# Patient Record
Sex: MALE | Race: WHITE | NOT HISPANIC OR LATINO | Employment: OTHER | URBAN - METROPOLITAN AREA
[De-identification: names, ages, dates, MRNs, and addresses within clinical notes are randomized per-mention and may not be internally consistent; named-entity substitution may affect disease eponyms.]

---

## 2018-08-21 RX ORDER — PROMETHAZINE HYDROCHLORIDE 25 MG/1
25 TABLET ORAL EVERY 6 HOURS PRN
COMMUNITY
End: 2020-02-24

## 2018-08-21 RX ORDER — LOSARTAN POTASSIUM AND HYDROCHLOROTHIAZIDE 25; 100 MG/1; MG/1
1 TABLET ORAL DAILY
COMMUNITY
End: 2022-08-09

## 2018-08-21 RX ORDER — GLIMEPIRIDE 2 MG/1
2 TABLET ORAL
COMMUNITY
End: 2022-08-09

## 2018-08-21 RX ORDER — TRIAMCINOLONE ACETONIDE 1 MG/G
CREAM TOPICAL 2 TIMES DAILY
COMMUNITY
End: 2022-08-09

## 2018-08-21 RX ORDER — TRAZODONE HYDROCHLORIDE 50 MG/1
50 TABLET ORAL NIGHTLY
COMMUNITY
End: 2023-04-04

## 2018-08-21 RX ORDER — FLUCONAZOLE 200 MG/1
200 TABLET ORAL WEEKLY
COMMUNITY
End: 2020-02-24

## 2018-08-21 RX ORDER — MELOXICAM 15 MG/1
15 TABLET ORAL DAILY
COMMUNITY
End: 2020-02-24

## 2018-08-21 RX ORDER — CLOBETASOL PROPIONATE 0.5 MG/G
CREAM TOPICAL 2 TIMES DAILY
COMMUNITY
End: 2022-08-09

## 2018-08-21 RX ORDER — VALSARTAN 320 MG/1
320 TABLET ORAL DAILY
COMMUNITY
End: 2020-02-24 | Stop reason: SDUPTHER

## 2018-08-21 RX ORDER — CYCLOBENZAPRINE HCL 10 MG
10 TABLET ORAL 3 TIMES DAILY PRN
COMMUNITY
End: 2020-02-24

## 2018-08-21 RX ORDER — RANITIDINE 300 MG/1
300 TABLET ORAL NIGHTLY
COMMUNITY
End: 2020-02-24

## 2018-08-21 RX ORDER — DICLOFENAC SODIUM 75 MG/1
75 TABLET, DELAYED RELEASE ORAL 2 TIMES DAILY
COMMUNITY
End: 2020-02-24

## 2018-08-21 RX ORDER — HYDROXYZINE HYDROCHLORIDE 25 MG/1
25 TABLET, FILM COATED ORAL EVERY 6 HOURS PRN
COMMUNITY
End: 2022-08-09

## 2018-08-22 ENCOUNTER — OFFICE VISIT (OUTPATIENT)
Dept: NEUROSURGERY | Facility: CLINIC | Age: 48
End: 2018-08-22

## 2018-08-22 VITALS — RESPIRATION RATE: 19 BRPM | WEIGHT: 315 LBS | HEIGHT: 73 IN | BODY MASS INDEX: 41.75 KG/M2 | TEMPERATURE: 97.8 F

## 2018-08-22 DIAGNOSIS — M47.816 FACET ARTHRITIS OF LUMBAR REGION: ICD-10-CM

## 2018-08-22 DIAGNOSIS — M51.36 BULGING LUMBAR DISC: ICD-10-CM

## 2018-08-22 DIAGNOSIS — M54.9 MECHANICAL BACK PAIN: Primary | ICD-10-CM

## 2018-08-22 DIAGNOSIS — M48.061 SPINAL STENOSIS OF LUMBAR REGION WITHOUT NEUROGENIC CLAUDICATION: ICD-10-CM

## 2018-08-22 DIAGNOSIS — M51.36 DEGENERATIVE DISC DISEASE, LUMBAR: ICD-10-CM

## 2018-08-22 PROCEDURE — 99243 OFF/OP CNSLTJ NEW/EST LOW 30: CPT | Performed by: NEUROLOGICAL SURGERY

## 2018-08-22 NOTE — PROGRESS NOTES
Patient: Booker De Oliveira  : 1970    Primary Care Provider: Juan Miguel Dominguez MD    Requesting Provider: As above        History    Chief Complaint: Low back pain.    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  Recently the pain increased.  At times it was extending into the left thigh to the knee.  He's been treated with muscle relaxants and ibuprofen.  His pain is largely improved at this point.  He denies any bowel or bladder dysfunction.  When present his symptoms were worsened by movement.  In addition to medicines ice was helpful.    Review of Systems   Constitutional: Negative for activity change, appetite change, chills, diaphoresis, fatigue, fever and unexpected weight change.   HENT: Negative for congestion, dental problem, drooling, ear discharge, ear pain, facial swelling, hearing loss, mouth sores, nosebleeds, postnasal drip, rhinorrhea, sinus pressure, sneezing, sore throat, tinnitus, trouble swallowing and voice change.    Eyes: Negative for photophobia, pain, discharge, redness, itching and visual disturbance.   Respiratory: Negative for apnea, cough, choking, chest tightness, shortness of breath, wheezing and stridor.    Cardiovascular: Negative for chest pain, palpitations and leg swelling.   Gastrointestinal: Negative for abdominal distention, abdominal pain, anal bleeding, blood in stool, constipation, diarrhea, nausea, rectal pain and vomiting.   Endocrine: Negative for cold intolerance, heat intolerance, polydipsia, polyphagia and polyuria.   Genitourinary: Negative for decreased urine volume, difficulty urinating, dysuria, enuresis, flank pain, frequency, genital sores, hematuria and urgency.   Musculoskeletal: Positive for back pain. Negative for arthralgias, gait problem, joint swelling, myalgias, neck pain and neck stiffness.   Skin: Negative for color change, pallor, rash and wound.   Allergic/Immunologic:  "Negative for environmental allergies, food allergies and immunocompromised state.   Neurological: Negative for dizziness, tremors, seizures, syncope, facial asymmetry, speech difficulty, weakness, light-headedness, numbness and headaches.   Hematological: Negative for adenopathy. Does not bruise/bleed easily.   Psychiatric/Behavioral: Negative for agitation, behavioral problems, confusion, decreased concentration, dysphoric mood, hallucinations, self-injury, sleep disturbance and suicidal ideas. The patient is not nervous/anxious and is not hyperactive.    All other systems reviewed and are negative.      The patient's past medical history, past surgical history, family history, and social history have been reviewed at length in the electronic medical record.    Physical Exam:   Temp 97.8 °F (36.6 °C) (Temporal Artery )   Resp 19   Ht 185.4 cm (73\")   Wt (!) 171 kg (377 lb 9.6 oz)   BMI 49.82 kg/m²   CONSTITUTIONAL: Patient is markedly overweight, pleasant and appears stated age.  CV: Heart regular rate and rhythm without murmur, rub, or gallop.  PULMONARY: Lungs are clear to ascultation.  MUSCULOSKELETAL:  Straight leg raising is negative.  Hubert's Sign is negative.  ROM in back is normal.  Tenderness in the back to palpation is not observed.  NEUROLOGICAL:  Orientation, memory, attention span, language function, and cognition have been examined and are intact.  Strength is intact in the lower extremities to direct testing.  Muscle tone is normal throughout.  Station and gait are normal.  Sensation is intact to light touch testing throughout.  Deep tendon reflexes are difficult to elicit throughout.  Coordination is intact.      Medical Decision Making    Data Review:   MRI of the lumbar spine dated 7/23/18 demonstrates diffuse degenerative disc disease and diffuse facet arthropathy.  There is some generalized mild narrowing of the spinal canal.  There is a lumbarized first sacral segment.  There is central " disc bulging at L2-3.  There is recess narrowing on the left at L4-5.    Diagnosis:   The patient harbors mechanical low back pain due to his degenerative disc and degenerative joint disease.  All of this is exacerbated by his obesity.    Treatment Options:   I have recommended continued symptomatic treatment.  I'd like him to get involved in a regular program of non-loading aerobic exercise.  Weight loss would help to reduce the frequency and severity of his symptoms.  He will follow-up on an as-needed basis.       Diagnosis Plan   1. Mechanical back pain     2. Bulging lumbar disc     3. Spinal stenosis of lumbar region without neurogenic claudication     4. Degenerative disc disease, lumbar     5. Facet arthritis of lumbar region (CMS/HCC)         Scribed for Ebenezer Turner MD by Fanta Graham CMA on 08/22/2018 at 9:01 AM    I, Dr. Turner, personally performed the services described in the documentation, as scribed in my presence, and it is both accurate and complete.

## 2018-08-22 NOTE — PATIENT INSTRUCTIONS
Back Exercises  If you have pain in your back, do these exercises 2-3 times each day or as told by your doctor. When the pain goes away, do the exercises once each day, but repeat the steps more times for each exercise (do more repetitions). If you do not have pain in your back, do these exercises once each day or as told by your doctor.  Exercises  Single Knee to Chest    Do these steps 3-5 times in a row for each leg:  Lie on your back on a firm bed or the floor with your legs stretched out.  Bring one knee to your chest.  Hold your knee to your chest by grabbing your knee or thigh.  Pull on your knee until you feel a gentle stretch in your lower back.  Keep doing the stretch for 10-30 seconds.  Slowly let go of your leg and straighten it.    Pelvic Tilt    Do these steps 5-10 times in a row:  Lie on your back on a firm bed or the floor with your legs stretched out.  Bend your knees so they point up to the ceiling. Your feet should be flat on the floor.  Tighten your lower belly (abdomen) muscles to press your lower back against the floor. This will make your tailbone point up to the ceiling instead of pointing down to your feet or the floor.  Stay in this position for 5-10 seconds while you gently tighten your muscles and breathe evenly.    Cat-Cow    Do these steps until your lower back bends more easily:  Get on your hands and knees on a firm surface. Keep your hands under your shoulders, and keep your knees under your hips. You may put padding under your knees.  Let your head hang down, and make your tailbone point down to the floor so your lower back is round like the back of a cat.  Stay in this position for 5 seconds.  Slowly lift your head and make your tailbone point up to the ceiling so your back hangs low (sags) like the back of a cow.  Stay in this position for 5 seconds.    Press-Ups    Do these steps 5-10 times in a row:  Lie on your belly (face-down) on the floor.  Place your hands near your head,  about shoulder-width apart.  While you keep your back relaxed and keep your hips on the floor, slowly straighten your arms to raise the top half of your body and lift your shoulders. Do not use your back muscles. To make yourself more comfortable, you may change where you place your hands.  Stay in this position for 5 seconds.  Slowly return to lying flat on the floor.    Bridges    Do these steps 10 times in a row:  Lie on your back on a firm surface.  Bend your knees so they point up to the ceiling. Your feet should be flat on the floor.  Tighten your butt muscles and lift your butt off of the floor until your waist is almost as high as your knees. If you do not feel the muscles working in your butt and the back of your thighs, slide your feet 1-2 inches farther away from your butt.  Stay in this position for 3-5 seconds.  Slowly lower your butt to the floor, and let your butt muscles relax.    If this exercise is too easy, try doing it with your arms crossed over your chest.  Belly Crunches    Do these steps 5-10 times in a row:  Lie on your back on a firm bed or the floor with your legs stretched out.  Bend your knees so they point up to the ceiling. Your feet should be flat on the floor.  Cross your arms over your chest.  Tip your chin a little bit toward your chest but do not bend your neck.  Tighten your belly muscles and slowly raise your chest just enough to lift your shoulder blades a tiny bit off of the floor.  Slowly lower your chest and your head to the floor.    Back Lifts  Do these steps 5-10 times in a row:  Lie on your belly (face-down) with your arms at your sides, and rest your forehead on the floor.  Tighten the muscles in your legs and your butt.  Slowly lift your chest off of the floor while you keep your hips on the floor. Keep the back of your head in line with the curve in your back. Look at the floor while you do this.  Stay in this position for 3-5 seconds.  Slowly lower your chest and  your face to the floor.    Contact a doctor if:  Your back pain gets a lot worse when you do an exercise.  Your back pain does not lessen 2 hours after you exercise.  If you have any of these problems, stop doing the exercises. Do not do them again unless your doctor says it is okay.  Get help right away if:  You have sudden, very bad back pain. If this happens, stop doing the exercises. Do not do them again unless your doctor says it is okay.  This information is not intended to replace advice given to you by your health care provider. Make sure you discuss any questions you have with your health care provider.  Document Released: 01/20/2012 Document Revised: 05/25/2017 Document Reviewed: 02/11/2016  [employer] Interactive Patient Education © 2018 [employer]      Calorie Counting for Weight Loss  Calories are units of energy. Your body needs a certain amount of calories from food to keep you going throughout the day. When you eat more calories than your body needs, your body stores the extra calories as fat. When you eat fewer calories than your body needs, your body burns fat to get the energy it needs.    Calorie counting means keeping track of how many calories you eat and drink each day. Calorie counting can be helpful if you need to lose weight. If you make sure to eat fewer calories than your body needs, you should lose weight. Ask your health care provider what a healthy weight is for you.  For calorie counting to work, you will need to eat the right number of calories in a day in order to lose a healthy amount of weight per week. A dietitian can help you determine how many calories you need in a day and will give you suggestions on how to reach your calorie goal.  · A healthy amount of weight to lose per week is usually 1-2 lb (0.5-0.9 kg). This usually means that your daily calorie intake should be reduced by 500-750 calories.  · Eating 1,200 - 1,500 calories per day can help most women lose  weight.  · Eating 1,500 - 1,800 calories per day can help most men lose weight.    What do I need to know about calorie counting?  In order to meet your daily calorie goal, you will need to:  · Find out how many calories are in each food you would like to eat. Try to do this before you eat.  · Decide how much of the food you plan to eat.  · Write down what you ate and how many calories it had. Doing this is called keeping a food log.    To successfully lose weight, it is important to balance calorie counting with a healthy lifestyle that includes regular activity. Aim for 150 minutes of moderate exercise (such as walking) or 75 minutes of vigorous exercise (such as running) each week.  Where do I find calorie information?    The number of calories in a food can be found on a Nutrition Facts label. If a food does not have a Nutrition Facts label, try to look up the calories online or ask your dietitian for help.  Remember that calories are listed per serving. If you choose to have more than one serving of a food, you will have to multiply the calories per serving by the amount of servings you plan to eat. For example, the label on a package of bread might say that a serving size is 1 slice and that there are 90 calories in a serving. If you eat 1 slice, you will have eaten 90 calories. If you eat 2 slices, you will have eaten 180 calories.  How do I keep a food log?  Immediately after each meal, record the following information in your food log:  · What you ate. Don't forget to include toppings, sauces, and other extras on the food.  · How much you ate. This can be measured in cups, ounces, or number of items.  · How many calories each food and drink had.  · The total number of calories in the meal.    Keep your food log near you, such as in a small notebook in your pocket, or use a mobile odell or website. Some programs will calculate calories for you and show you how many calories you have left for the day to meet  "your goal.  What are some calorie counting tips?  · Use your calories on foods and drinks that will fill you up and not leave you hungry:  ? Some examples of foods that fill you up are nuts and nut butters, vegetables, lean proteins, and high-fiber foods like whole grains. High-fiber foods are foods with more than 5 g fiber per serving.  ? Drinks such as sodas, specialty coffee drinks, alcohol, and juices have a lot of calories, yet do not fill you up.  · Eat nutritious foods and avoid empty calories. Empty calories are calories you get from foods or beverages that do not have many vitamins or protein, such as candy, sweets, and soda. It is better to have a nutritious high-calorie food (such as an avocado) than a food with few nutrients (such as a bag of chips).  · Know how many calories are in the foods you eat most often. This will help you calculate calorie counts faster.  · Pay attention to calories in drinks. Low-calorie drinks include water and unsweetened drinks.  · Pay attention to nutrition labels for \"low fat\" or \"fat free\" foods. These foods sometimes have the same amount of calories or more calories than the full fat versions. They also often have added sugar, starch, or salt, to make up for flavor that was removed with the fat.  · Find a way of tracking calories that works for you. Get creative. Try different apps or programs if writing down calories does not work for you.  What are some portion control tips?  · Know how many calories are in a serving. This will help you know how many servings of a certain food you can have.  · Use a measuring cup to measure serving sizes. You could also try weighing out portions on a kitchen scale. With time, you will be able to estimate serving sizes for some foods.  · Take some time to put servings of different foods on your favorite plates, bowls, and cups so you know what a serving looks like.  · Try not to eat straight from a bag or box. Doing this can lead to " overeating. Put the amount you would like to eat in a cup or on a plate to make sure you are eating the right portion.  · Use smaller plates, glasses, and bowls to prevent overeating.  · Try not to multitask (for example, watch TV or use your computer) while eating. If it is time to eat, sit down at a table and enjoy your food. This will help you to know when you are full. It will also help you to be aware of what you are eating and how much you are eating.  What are tips for following this plan?  Reading food labels  · Check the calorie count compared to the serving size. The serving size may be smaller than what you are used to eating.  · Check the source of the calories. Make sure the food you are eating is high in vitamins and protein and low in saturated and trans fats.  Shopping  · Read nutrition labels while you shop. This will help you make healthy decisions before you decide to purchase your food.  · Make a grocery list and stick to it.  Cooking  · Try to cook your favorite foods in a healthier way. For example, try baking instead of frying.  · Use low-fat dairy products.  Meal planning  · Use more fruits and vegetables. Half of your plate should be fruits and vegetables.  · Include lean proteins like poultry and fish.  How do I count calories when eating out?  · Ask for smaller portion sizes.  · Consider sharing an entree and sides instead of getting your own entree.  · If you get your own entree, eat only half. Ask for a box at the beginning of your meal and put the rest of your entree in it so you are not tempted to eat it.  · If calories are listed on the menu, choose the lower calorie options.  · Choose dishes that include vegetables, fruits, whole grains, low-fat dairy products, and lean protein.  · Choose items that are boiled, broiled, grilled, or steamed. Stay away from items that are buttered, battered, fried, or served with cream sauce. Items labeled “crispy” are usually fried, unless stated  otherwise.  · Choose water, low-fat milk, unsweetened iced tea, or other drinks without added sugar. If you want an alcoholic beverage, choose a lower calorie option such as a glass of wine or light beer.  · Ask for dressings, sauces, and syrups on the side. These are usually high in calories, so you should limit the amount you eat.  · If you want a salad, choose a garden salad and ask for grilled meats. Avoid extra toppings like albert, cheese, or fried items. Ask for the dressing on the side, or ask for olive oil and vinegar or lemon to use as dressing.  · Estimate how many servings of a food you are given. For example, a serving of cooked rice is ½ cup or about the size of half a baseball. Knowing serving sizes will help you be aware of how much food you are eating at restaurants. The list below tells you how big or small some common portion sizes are based on everyday objects:  ? 1 oz--4 stacked dice.  ? 3 oz--1 deck of cards.  ? 1 tsp--1 die.  ? 1 Tbsp--½ a ping-pong ball.  ? 2 Tbsp--1 ping-pong ball.  ? ½ cup--½ baseball.  ? 1 cup--1 baseball.  Summary  · Calorie counting means keeping track of how many calories you eat and drink each day. If you eat fewer calories than your body needs, you should lose weight.  · A healthy amount of weight to lose per week is usually 1-2 lb (0.5-0.9 kg). This usually means reducing your daily calorie intake by 500-750 calories.  · The number of calories in a food can be found on a Nutrition Facts label. If a food does not have a Nutrition Facts label, try to look up the calories online or ask your dietitian for help.  · Use your calories on foods and drinks that will fill you up, and not on foods and drinks that will leave you hungry.  · Use smaller plates, glasses, and bowls to prevent overeating.  This information is not intended to replace advice given to you by your health care provider. Make sure you discuss any questions you have with your health care provider.  Document  Released: 12/18/2006 Document Revised: 11/17/2017 Document Reviewed: 11/17/2017  [employer] Interactive Patient Education © 2018 [employer]

## 2020-01-27 NOTE — PROGRESS NOTES
Subjective   Patient ID: Booker De Oliveira is a 49 y.o. male is here today as a self referral for constant back pain that radiates into the l hip and L leg. Patient also reports one episode of numbness in his L leg. Patient has tried muscle relaxer's and OTC ibuprofen and provides moderate relief. He has also completed physical therapy.     History of Present Illness     Mr. De Oliveira is being seen today for neurosurgical opinion as a self-referral for worsening left-sided low back pain, left gluteal pain, left lateral hip pain, left groin and left anterior thigh pain.  He has been having these issues since June 2018.  His symptoms progressively worsened in early December 2019.  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  He is obese.  He has lost 75 lbs over the last year. He states that it is getting to the point that he cannot stand or walk for very long without discomfort.  He is able to achieve some mild relief by taking 800 mg ibuprofen twice a day and an 800 mg Skelaxin at bedtime.  He does not want to continue with this regimen as ibuprofen is beginning to bother his stomach. He has no history of prior lumbar surgery.  He reports no bowel or bladder incontinence.  He states that with prolonged standing with turning toward the left he will feel weakness in his left leg as if it might give out.    The following portions of the patient's history were reviewed and updated as appropriate: allergies, current medications, past family history, past medical history, past social history, past surgical history and problem list.    Review of Systems   Constitutional: Positive for activity change.   Respiratory: Negative for chest tightness and shortness of breath.    Musculoskeletal: Positive for arthralgias and back pain.   All other systems reviewed and are negative.      Objective   Physical Exam   Constitutional: He is oriented to person, place, and time. He appears well-developed and  well-nourished. He is cooperative. No distress.   HENT:   Head: Normocephalic and atraumatic.   Eyes: Conjunctivae are normal. Right eye exhibits no discharge. Left eye exhibits no discharge.   Neck: Normal range of motion. Neck supple. No tracheal deviation present.   Cardiovascular: Normal rate and intact distal pulses.   Pulmonary/Chest: Effort normal. No respiratory distress.   Abdominal: Soft. He exhibits no distension. There is no tenderness.   Musculoskeletal: Normal range of motion. He exhibits tenderness. He exhibits no edema.   Tender to palpation over the left gluteal region.  Pain is exacerbated with left lateral bending and left lumbar hyperextension.  Negative compression test of the left hip.   Neurological: He is alert and oriented to person, place, and time. He has normal reflexes. He displays normal reflexes. No sensory deficit. He exhibits normal muscle tone. Coordination normal. GCS eye subscore is 4. GCS verbal subscore is 5. GCS motor subscore is 6.   No motor deficits.  Increased sensitivity to light touch in the left anterior thigh.  DTR's normal. Negative Ramírez's; negative clonus. SLR positive on the left at 30 degrees.  Hubert's negative bilaterally.  Able to bear weight on heels; unable to bear weight on toes without significant pain in the left lateral hip/gluteal region.      Skin: Skin is warm and dry. He is not diaphoretic. No erythema.   Psychiatric: He has a normal mood and affect. Thought content normal.   Vitals reviewed.    Neurologic Exam     Mental Status   Oriented to person, place, and time.       Assessment/Plan   Independent Review of Radiographic Studies:      I have reviewed the MRI images from [facility] open MRI dated July 24, 2018.  The MRI showed multilevel degenerative changes with concentric disc bulges.  This seems to be greatest at L1 to where there is moderate to severe canal and moderate foraminal stenosis.  There is also moderate facet  arthropathy.  There is also advanced facet arthropathy at L4-5 with mild canal and moderate to severe foraminal narrowing.    Medical Decision Making:      Mr. De Oliveira was seen in the office today for the above complaints.  I have reviewed the lumbar MRI and discussed those results with the patient and his wife is with him today.  The MRI shows significant compression at L4-5 as well as L3-4 particularly on the left side.  It certainly explains the pain that he has been having.  I have offered epidural steroid injections. The patient understands the risks of the epidural injection which include infection, bleeding, and positional headache possibly requiring a blood patch.  He understands that surgery may be an option only if he fails conservative measures.  I will have him follow-up with  after the epidural injections for further reevaluation and discussion regarding further treatment from there.    Booker was seen today for back pain.    Diagnoses and all orders for this visit:    Lumbar back pain with radiculopathy affecting left lower extremity  -     Epidural Block    Spinal stenosis, lumbar region, with neurogenic claudication  -     Epidural Block      Return in about 6 weeks (around 3/10/2020) for Dr. De Guzman.

## 2020-01-28 ENCOUNTER — OFFICE VISIT (OUTPATIENT)
Dept: NEUROSURGERY | Facility: CLINIC | Age: 50
End: 2020-01-28

## 2020-01-28 VITALS
DIASTOLIC BLOOD PRESSURE: 75 MMHG | HEIGHT: 73 IN | SYSTOLIC BLOOD PRESSURE: 121 MMHG | BODY MASS INDEX: 41.75 KG/M2 | HEART RATE: 58 BPM | WEIGHT: 315 LBS

## 2020-01-28 DIAGNOSIS — M54.16 LUMBAR BACK PAIN WITH RADICULOPATHY AFFECTING LEFT LOWER EXTREMITY: Primary | ICD-10-CM

## 2020-01-28 DIAGNOSIS — M48.062 SPINAL STENOSIS, LUMBAR REGION, WITH NEUROGENIC CLAUDICATION: ICD-10-CM

## 2020-01-28 PROCEDURE — 99203 OFFICE O/P NEW LOW 30 MIN: CPT | Performed by: NURSE PRACTITIONER

## 2020-01-28 RX ORDER — VALSARTAN AND HYDROCHLOROTHIAZIDE 320; 25 MG/1; MG/1
TABLET, FILM COATED ORAL DAILY
COMMUNITY
Start: 2019-11-11 | End: 2022-07-08

## 2020-01-28 RX ORDER — PRAVASTATIN SODIUM 20 MG
TABLET ORAL
COMMUNITY
End: 2022-08-09

## 2020-02-10 ENCOUNTER — APPOINTMENT (OUTPATIENT)
Dept: PAIN MEDICINE | Facility: HOSPITAL | Age: 50
End: 2020-02-10

## 2020-02-17 ENCOUNTER — APPOINTMENT (OUTPATIENT)
Dept: PAIN MEDICINE | Facility: HOSPITAL | Age: 50
End: 2020-02-17

## 2020-02-24 ENCOUNTER — ANESTHESIA (OUTPATIENT)
Dept: PAIN MEDICINE | Facility: HOSPITAL | Age: 50
End: 2020-02-24

## 2020-02-24 ENCOUNTER — HOSPITAL ENCOUNTER (OUTPATIENT)
Dept: GENERAL RADIOLOGY | Facility: HOSPITAL | Age: 50
Discharge: HOME | End: 2020-02-24

## 2020-02-24 ENCOUNTER — HOSPITAL ENCOUNTER (OUTPATIENT)
Dept: PAIN MEDICINE | Facility: HOSPITAL | Age: 50
Discharge: HOME | End: 2020-02-24
Admitting: ANESTHESIOLOGY

## 2020-02-24 ENCOUNTER — ANESTHESIA EVENT (OUTPATIENT)
Dept: PAIN MEDICINE | Facility: HOSPITAL | Age: 50
End: 2020-02-24

## 2020-02-24 VITALS
HEART RATE: 57 BPM | SYSTOLIC BLOOD PRESSURE: 112 MMHG | WEIGHT: 310 LBS | HEIGHT: 73 IN | TEMPERATURE: 98.3 F | RESPIRATION RATE: 16 BRPM | OXYGEN SATURATION: 95 % | DIASTOLIC BLOOD PRESSURE: 71 MMHG | BODY MASS INDEX: 41.08 KG/M2

## 2020-02-24 DIAGNOSIS — M48.062 SPINAL STENOSIS, LUMBAR REGION WITH NEUROGENIC CLAUDICATION: Primary | ICD-10-CM

## 2020-02-24 DIAGNOSIS — R52 PAIN: ICD-10-CM

## 2020-02-24 LAB — GLUCOSE BLDC GLUCOMTR-MCNC: 72 MG/DL (ref 70–130)

## 2020-02-24 PROCEDURE — 0 IOPAMIDOL 41 % SOLUTION: Performed by: ANESTHESIOLOGY

## 2020-02-24 PROCEDURE — 25010000002 METHYLPREDNISOLONE PER 80 MG: Performed by: ANESTHESIOLOGY

## 2020-02-24 PROCEDURE — 25010000002 MIDAZOLAM PER 1 MG: Performed by: ANESTHESIOLOGY

## 2020-02-24 PROCEDURE — 82962 GLUCOSE BLOOD TEST: CPT

## 2020-02-24 PROCEDURE — 77003 FLUOROGUIDE FOR SPINE INJECT: CPT

## 2020-02-24 PROCEDURE — C1755 CATHETER, INTRASPINAL: HCPCS

## 2020-02-24 RX ORDER — MIDAZOLAM HYDROCHLORIDE 1 MG/ML
1 INJECTION INTRAMUSCULAR; INTRAVENOUS
Status: DISCONTINUED | OUTPATIENT
Start: 2020-02-24 | End: 2020-02-25 | Stop reason: HOSPADM

## 2020-02-24 RX ORDER — CYANOCOBALAMIN (VITAMIN B-12) 500 MCG
500 TABLET ORAL DAILY
COMMUNITY

## 2020-02-24 RX ORDER — METHYLPREDNISOLONE ACETATE 80 MG/ML
80 INJECTION, SUSPENSION INTRA-ARTICULAR; INTRALESIONAL; INTRAMUSCULAR; SOFT TISSUE ONCE
Status: COMPLETED | OUTPATIENT
Start: 2020-02-24 | End: 2020-02-24

## 2020-02-24 RX ORDER — SODIUM CHLORIDE 0.9 % (FLUSH) 0.9 %
3 SYRINGE (ML) INJECTION EVERY 12 HOURS SCHEDULED
Status: DISCONTINUED | OUTPATIENT
Start: 2020-02-24 | End: 2020-02-25 | Stop reason: HOSPADM

## 2020-02-24 RX ORDER — SODIUM CHLORIDE 0.9 % (FLUSH) 0.9 %
3-10 SYRINGE (ML) INJECTION AS NEEDED
Status: DISCONTINUED | OUTPATIENT
Start: 2020-02-24 | End: 2020-02-25 | Stop reason: HOSPADM

## 2020-02-24 RX ORDER — FENTANYL CITRATE 50 UG/ML
50 INJECTION, SOLUTION INTRAMUSCULAR; INTRAVENOUS AS NEEDED
Status: DISCONTINUED | OUTPATIENT
Start: 2020-02-24 | End: 2020-02-25 | Stop reason: HOSPADM

## 2020-02-24 RX ORDER — IBUPROFEN 800 MG/1
800 TABLET ORAL EVERY 6 HOURS PRN
COMMUNITY
End: 2022-08-09

## 2020-02-24 RX ORDER — LIDOCAINE HYDROCHLORIDE 10 MG/ML
1 INJECTION, SOLUTION INFILTRATION; PERINEURAL ONCE
Status: DISCONTINUED | OUTPATIENT
Start: 2020-02-24 | End: 2020-02-25 | Stop reason: HOSPADM

## 2020-02-24 RX ORDER — METAXALONE 800 MG/1
800 TABLET ORAL 3 TIMES DAILY PRN
COMMUNITY
End: 2022-08-09

## 2020-02-24 RX ADMIN — METHYLPREDNISOLONE ACETATE 80 MG: 80 INJECTION, SUSPENSION INTRA-ARTICULAR; INTRALESIONAL; INTRAMUSCULAR; SOFT TISSUE at 12:38

## 2020-02-24 RX ADMIN — IOPAMIDOL 10 ML: 408 INJECTION, SOLUTION INTRATHECAL at 12:37

## 2020-02-24 RX ADMIN — MIDAZOLAM 2 MG: 1 INJECTION INTRAMUSCULAR; INTRAVENOUS at 12:33

## 2020-02-24 NOTE — ANESTHESIA PROCEDURE NOTES
PAIN Epidural block    Pre-sedation assessment completed: 2/24/2020 12:29 PM    Patient reassessed immediately prior to procedure    Patient location during procedure: pain clinic  Start Time: 2/24/2020 12:29 PM  Stop Time: 2/24/2020 12:38 PM  Indication:at surgeon's request and procedure for pain  Performed By  Anesthesiologist: David Dooley MD  Preanesthetic Checklist  Completed: patient identified, site marked, surgical consent, pre-op evaluation, timeout performed, IV checked, risks and benefits discussed and monitors and equipment checked  Additional Notes  Dx:  Post-Op Diagnosis Codes:     * Lumbar spinal stenosis (M48.061)  80 mg depomedrol in epid    Plan : return to clinic as needed  Prep:  Pt Position:prone (prone)  Sterile Tech:cap, gloves, mask and sterile barrier  Prep:chlorhexidine gluconate and isopropyl alcohol  Monitoring:blood pressure monitoring, EKG and continuous pulse oximetry  Procedure:Sedation: no     Approach:midline  Guidance: fluoroscopy and c arm pa and lat and loss of resistance  Location:lumbar  Level:4-5 (interlaminar)  Needle Type:[employer] Inctead  Needle Gauge:20  Aspiration:negative  Medications:  Depomedrol:80 mg  Preservative Free Saline:3mL    Post Assessment:  Post-procedure: bandaid.  Pt Tolerance:patient tolerated the procedure well with no apparent complications  Complications:no

## 2020-02-24 NOTE — H&P
[facility]    History and Physical    Patient Name: Booker De Oliveira  :  1970  MRN:  [id number]  Date of Admission: 2020    Subjective     Patient is a 49 y.o. male presents with chief complaint of chronic,  low back, hips: left and leg: left pain. Pain 7-9/10, aching, stabbing, muscle spasms; ? Etiology.  Onset of symptoms was gradual  starting 2 years ago.  Symptoms are associated/aggravated by activity, lying down, sitting or twisting. Symptoms improve with pain medication    The following portions of the patients history were reviewed and updated as appropriate: current medications, allergies, past medical history, past surgical history, past family history, past social history and problem list                Objective     Past Medical History:   Past Medical History:   Diagnosis Date   • Diabetes mellitus (CMS/HCC)    • Hepatitis B    • Hyperlipidemia    • Hypertension      Past Surgical History:   Past Surgical History:   Procedure Laterality Date   • CHOLECYSTECTOMY  2013    [place], [place]   • [place] [facility] [place] [facility]     Family History:   Family History   Problem Relation Age of Onset   • Heart disease Mother    • Cancer Father      Social History:   Social History     Tobacco Use   • Smoking status: Never Smoker   • Smokeless tobacco: Never Used   Substance Use Topics   • Alcohol use: No   • Drug use: No       Vital Signs Range for the last 24 hours  Temperature:     Temp Source:     BP:     Pulse:     Respirations:     SPO2:     O2 Amount (l/min):     O2 Devices     Weight:           --------------------------------------------------------------------------------    Current Outpatient Medications   Medication Sig Dispense Refill   • clobetasol (TEMOVATE) 0.05 % cream Apply  topically to the appropriate area as directed 2 (Two) Times a Day.     • cyclobenzaprine (FLEXERIL) 10 MG tablet Take 10 mg by mouth 3 (Three) Times a Day As Needed for Muscle  Spasms.     • diclofenac (VOLTAREN) 75 MG EC tablet Take 75 mg by mouth 2 (Two) Times a Day.     • Empagliflozin (JARDIANCE) 25 MG tablet Take  by mouth.     • fluconazole (DIFLUCAN) 200 MG tablet Take 200 mg by mouth 1 (One) Time Per Week.     • glimepiride (AMARYL) 2 MG tablet Take 2 mg by mouth Every Morning Before Breakfast.     • hydrOXYzine (ATARAX) 25 MG tablet Take 25 mg by mouth Every 6 (Six) Hours As Needed for Itching.     • losartan-hydrochlorothiazide (HYZAAR) 100-25 MG per tablet Take 1 tablet by mouth Daily.     • meloxicam (MOBIC) 15 MG tablet Take 15 mg by mouth Daily.     • metFORMIN (GLUCOPHAGE) 1000 MG tablet Take 1,000 mg by mouth 2 (Two) Times a Day With Meals.     • pravastatin (PRAVACHOL) 20 MG tablet pravastatin 20 mg tablet   Take 1 tablet every day by oral route.     • promethazine (PHENERGAN) 25 MG tablet Take 25 mg by mouth Every 6 (Six) Hours As Needed for Nausea or Vomiting.     • raNITIdine (ZANTAC) 300 MG tablet Take 300 mg by mouth Every Night.     • traZODone (DESYREL) 50 MG tablet Take 50 mg by mouth Every Night.     • triamcinolone (KENALOG) 0.1 % cream Apply  topically to the appropriate area as directed 2 (Two) Times a Day.     • valsartan (DIOVAN) 320 MG tablet Take 320 mg by mouth Daily.     • valsartan-hydrochlorothiazide (DIOVAN-HCT) 320-25 MG per tablet Take  by mouth Daily.       Current Facility-Administered Medications   Medication Dose Route Frequency Provider Last Rate Last Dose   • fentaNYL citrate (PF) (SUBLIMAZE) injection 50 mcg  50 mcg Intravenous PRN David Dooley MD       • iopamidol (ISOVUE-M 200) injection 41%  3 mL Epidural Once in imaging David Dooley MD       • lidocaine (XYLOCAINE) 1 % injection 1 mL  1 mL Intradermal Once David Dooley MD       • methylPREDNISolone acetate (DEPO-medrol) injection 80 mg  80 mg Epidural Once David Dooley MD       • midazolam (VERSED) injection 1 mg  1 mg Intravenous Q5 Min PRN David Dooley MD            --------------------------------------------------------------------------------  Assessment/Plan      Anesthesia Evaluation     Patient summary reviewed and Nursing notes reviewed         Pain impairs ability to perform ADLs: Bathing, Exercise/Activity, Ambulation, Dressing, Sleeping and Working  Modalities previously tried to control pain with limited effectiveness within the last 4-6 weeks: Rest, OTC medications, Steroids and Prescription medications     Airway   Mallampati: II  Dental - normal exam     Pulmonary - negative pulmonary ROS and normal exam   Cardiovascular - normal exam    (+) hypertension,       Neuro/Psych- negative ROS  (+)   left straight leg raise test,     GI/Hepatic/Renal/Endo    (+) morbid obesity,  diabetes mellitus,     Musculoskeletal (-) negative ROS and normal exam    Abdominal  - normal exam   Substance History - negative use     OB/GYN negative ob/gyn ROS         Other                 Diagnosis and Plan    Treatment Plan  ASA 3      Procedures: Lumbar Epidural Steroid Injection(LESI), With fluoroscopy,       Anesthetic plan and risks discussed with patient.          CHIEF COMPLAINT:       HISTORY OF PRESENT ILLNESS:      PAST MEDICAL HISTORY:  Current Outpatient Medications on File Prior to Encounter   Medication Sig Dispense Refill   • clobetasol (TEMOVATE) 0.05 % cream Apply  topically to the appropriate area as directed 2 (Two) Times a Day.     • cyclobenzaprine (FLEXERIL) 10 MG tablet Take 10 mg by mouth 3 (Three) Times a Day As Needed for Muscle Spasms.     • diclofenac (VOLTAREN) 75 MG EC tablet Take 75 mg by mouth 2 (Two) Times a Day.     • Empagliflozin (JARDIANCE) 25 MG tablet Take  by mouth.     • fluconazole (DIFLUCAN) 200 MG tablet Take 200 mg by mouth 1 (One) Time Per Week.     • glimepiride (AMARYL) 2 MG tablet Take 2 mg by mouth Every Morning Before Breakfast.     • hydrOXYzine (ATARAX) 25 MG tablet Take 25 mg by mouth Every 6 (Six) Hours As Needed for Itching.      • losartan-hydrochlorothiazide (HYZAAR) 100-25 MG per tablet Take 1 tablet by mouth Daily.     • meloxicam (MOBIC) 15 MG tablet Take 15 mg by mouth Daily.     • metFORMIN (GLUCOPHAGE) 1000 MG tablet Take 1,000 mg by mouth 2 (Two) Times a Day With Meals.     • pravastatin (PRAVACHOL) 20 MG tablet pravastatin 20 mg tablet   Take 1 tablet every day by oral route.     • promethazine (PHENERGAN) 25 MG tablet Take 25 mg by mouth Every 6 (Six) Hours As Needed for Nausea or Vomiting.     • raNITIdine (ZANTAC) 300 MG tablet Take 300 mg by mouth Every Night.     • traZODone (DESYREL) 50 MG tablet Take 50 mg by mouth Every Night.     • triamcinolone (KENALOG) 0.1 % cream Apply  topically to the appropriate area as directed 2 (Two) Times a Day.     • valsartan (DIOVAN) 320 MG tablet Take 320 mg by mouth Daily.     • valsartan-hydrochlorothiazide (DIOVAN-HCT) 320-25 MG per tablet Take  by mouth Daily.       No current facility-administered medications on file prior to encounter.        Past Medical History:   Diagnosis Date   • Diabetes mellitus (CMS/HCC)    • Hepatitis B    • Hyperlipidemia    • Hypertension          SOCIAL HISTORY:  No tobacco    REVIEW OF SYSTEMS:  No hematologic infectious or constitutional symptoms  Other review of systems non-contributory  Negative screen for ISHAAN      PHYSICAL EXAM:  There were no vitals taken for this visit.  Well-developed well-nourished no acute distress  Extra ocular movements intact  Mallampati class 2 airway  Cardiac:  Regular rate and rhythm  Lungs:  Clear to auscultation bilaterally with good effort  Alert and oriented ×3  Deep tendon reflexes normal in the bilateral patella  Negative straight leg raise right, left positive  5 out of 5 strength bilateral upper and lower extremities  Lumbar spine without obvious deformities ecchymoses  Lumbar spine nontender to palpation      DIAGNOSIS:  Post-Op Diagnosis Codes:     * Lumbar spinal stenosis [M48.061]    PLAN:  1.  Lumbar 4  epidural steroid injections, up to 3, spaced 1-2 weeks apart.  If pain control is acceptable after 1 or 2 injections, it was discussed with the patient that they may return for the subsequent injections if and when their pain returns.  The risks were discussed with the patient including failure of relief, worsening pain, Headache (post dural puncture headache), bleeding (epidural hematoma) and infection (epidural abscess or skin infection).  2.  Physical therapy exercises at home as prescribed by physical therapy or from the pain clinic handout (given to the patient).  Continuation of these exercises every day, or multiple times per week, even when the patient has good pain relief, was stressed to the patient as a preventative measure to decrease the frequency and severity of future pain episodes.  3.  Continue pain medicines as already prescribed.  If patient not currently taking any, it is recommended to begin Acetaminophen 1000 mg po q 8 hours.  If other medicines containing Acetaminophen are currently prescribed, maintain daily dose at 3000 mg.    4.  If they can tolerate NSAIDS, it is recommended to take Ibuprofen 600 mg po q 6 hours for 7 days during pain exacerbations.  Alternatively, they may substitute an NSAID of their choice (e.g. Aleve).  This may be taken at the same time as Acetaminophen.  5.  Heat and ice to the affected area as tolerated for pain control.  It was discussed that heating pads can cause burns.  6.  Daily low impact exercise such as walking or water exercise was recommended to maintain overall health and aid in weight control.   7.  Follow up as needed for subsequent injections.  8.  Patient was counseled to abstain from tobacco products.    Diagnosis     * Lumbar spinal stenosis [M48.061]

## 2020-03-13 ENCOUNTER — APPOINTMENT (OUTPATIENT)
Dept: PAIN MEDICINE | Facility: HOSPITAL | Age: 50
End: 2020-03-13

## 2022-05-25 RX ORDER — SEMAGLUTIDE 1.34 MG/ML
INJECTION, SOLUTION SUBCUTANEOUS
Qty: 3 ML | Refills: 1 | Status: SHIPPED | OUTPATIENT
Start: 2022-05-25 | End: 2022-07-08

## 2022-06-01 RX ORDER — DICLOFENAC SODIUM 75 MG/1
TABLET, DELAYED RELEASE ORAL
Qty: 180 TABLET | Refills: 0 | Status: SHIPPED | OUTPATIENT
Start: 2022-06-01 | End: 2022-08-18

## 2022-07-08 RX ORDER — VALSARTAN AND HYDROCHLOROTHIAZIDE 320; 25 MG/1; MG/1
TABLET, FILM COATED ORAL
Qty: 30 TABLET | Refills: 2 | Status: SHIPPED | OUTPATIENT
Start: 2022-07-08 | End: 2022-08-09

## 2022-07-08 RX ORDER — SEMAGLUTIDE 1.34 MG/ML
INJECTION, SOLUTION SUBCUTANEOUS
Qty: 3 ML | Refills: 1 | Status: SHIPPED | OUTPATIENT
Start: 2022-07-08 | End: 2022-09-22

## 2022-08-09 ENCOUNTER — OFFICE VISIT (OUTPATIENT)
Dept: FAMILY MEDICINE CLINIC | Facility: CLINIC | Age: 52
End: 2022-08-09

## 2022-08-09 VITALS
BODY MASS INDEX: 40.71 KG/M2 | DIASTOLIC BLOOD PRESSURE: 82 MMHG | OXYGEN SATURATION: 97 % | HEART RATE: 63 BPM | WEIGHT: 308.6 LBS | SYSTOLIC BLOOD PRESSURE: 116 MMHG

## 2022-08-09 DIAGNOSIS — Z12.5 SCREENING FOR PROSTATE CANCER: ICD-10-CM

## 2022-08-09 DIAGNOSIS — E11.9 TYPE 2 DIABETES MELLITUS WITHOUT COMPLICATION, WITHOUT LONG-TERM CURRENT USE OF INSULIN: ICD-10-CM

## 2022-08-09 DIAGNOSIS — I10 BENIGN ESSENTIAL HTN: ICD-10-CM

## 2022-08-09 DIAGNOSIS — E78.2 MIXED HYPERLIPIDEMIA: ICD-10-CM

## 2022-08-09 DIAGNOSIS — Z00.00 ANNUAL PHYSICAL EXAM: Primary | ICD-10-CM

## 2022-08-09 DIAGNOSIS — R22.1 NECK MASS: ICD-10-CM

## 2022-08-09 LAB
BILIRUB BLD-MCNC: NEGATIVE MG/DL
CLARITY, POC: CLEAR
COLOR UR: YELLOW
EXPIRATION DATE: NORMAL
GLUCOSE UR STRIP-MCNC: NEGATIVE MG/DL
KETONES UR QL: NEGATIVE
LEUKOCYTE EST, POC: NEGATIVE
Lab: NORMAL
NITRITE UR-MCNC: NEGATIVE MG/ML
PH UR: 5.5 [PH] (ref 5–8)
POC MICROALBUMIN URINE: 20
PROT UR STRIP-MCNC: NEGATIVE MG/DL
RBC # UR STRIP: NEGATIVE /UL
SP GR UR: 1.03 (ref 1–1.03)
UROBILINOGEN UR QL: NORMAL

## 2022-08-09 PROCEDURE — 81003 URINALYSIS AUTO W/O SCOPE: CPT | Performed by: NURSE PRACTITIONER

## 2022-08-09 PROCEDURE — 82044 UR ALBUMIN SEMIQUANTITATIVE: CPT | Performed by: NURSE PRACTITIONER

## 2022-08-09 PROCEDURE — 99396 PREV VISIT EST AGE 40-64: CPT | Performed by: NURSE PRACTITIONER

## 2022-08-09 RX ORDER — HYDROCHLOROTHIAZIDE 25 MG/1
TABLET ORAL
COMMUNITY
Start: 2022-08-01 | End: 2022-08-09

## 2022-08-09 RX ORDER — PHENTERMINE HYDROCHLORIDE 37.5 MG/1
1 TABLET ORAL DAILY
COMMUNITY
End: 2022-08-09

## 2022-08-09 RX ORDER — FAMOTIDINE 40 MG/1
40 TABLET, FILM COATED ORAL
COMMUNITY
Start: 2022-06-02 | End: 2022-08-09

## 2022-08-09 RX ORDER — AMLODIPINE BESYLATE 10 MG/1
10 TABLET ORAL DAILY
COMMUNITY
Start: 2022-05-12 | End: 2022-08-09

## 2022-08-09 RX ORDER — VALSARTAN AND HYDROCHLOROTHIAZIDE 320; 25 MG/1; MG/1
1 TABLET, FILM COATED ORAL DAILY
COMMUNITY
End: 2022-11-14

## 2022-08-09 NOTE — PROGRESS NOTES
Chief Complaint  Annual Exam    Subjective          Booker De Oliveira presents to [facility]  History of Present Illness     Is fasting.  No smoking no alcohol use.  Trying to watch his diet and walks daily.  Hypertension hyperlipidemia diabetes type 2 insomnia all well controlled on current medications with no issues or side effects.  States colonoscopy up-to-date for the next 8 years or so.  No dizziness no headache no chest pain no chest pressure no shortness of breath no trouble breathing no urinary or bowel issues.  Overall states he is doing well.    States couple months ago he noticed a walnut sized mass present under skin to posterior center neck.  No redness no warmth.  Slightly tender at times.  No fever no chills.  No discharge.  No injury.    Objective   Vital Signs:   /82   Pulse 63   Wt (!) 140 kg (308 lb 9.6 oz)   SpO2 97%   BMI 40.71 kg/m²     Body mass index is 40.71 kg/m².    Review of Systems   Constitutional: Negative for chills, fatigue, fever, unexpected weight gain and unexpected weight loss.   HENT: Negative for congestion.    Eyes: Negative for blurred vision and visual disturbance.   Respiratory: Negative for cough, shortness of breath and wheezing.    Cardiovascular: Negative for chest pain, palpitations and leg swelling.   Gastrointestinal: Negative.    Genitourinary: Negative for decreased urine volume, dysuria, frequency, hematuria and urgency.   Musculoskeletal: Negative for arthralgias and back pain.   Skin: Negative for rash and wound.   Neurological: Negative for dizziness, headache and confusion.   Psychiatric/Behavioral: Negative.        Past History:  Medical History: has a past medical history of Benign essential HTN, Hepatitis B, Hyperlipidemia, Hypertension, Low back pain, Sleep apnea, and Type 2 diabetes mellitus (HCC).   Surgical History: has a past surgical history that includes Shoulder arthroscopy (Left); Cholecystectomy (2013);  Appendectomy; and Hernia repair.   Family History: family history includes Cancer in his father; Coronary artery disease in his mother; Heart disease in his mother; Hypertension in his father; Liver cancer in his father.   Social History: reports that he has never smoked. He has never used smokeless tobacco. He reports that he does not drink alcohol and does not use drugs.    PHQ-2 Depression Screening  Little interest or pleasure in doing things? 0-->not at all   Feeling down, depressed, or hopeless? 0-->not at all   PHQ-2 Total Score 0        PHQ-9 Depression Screening  Little interest or pleasure in doing things? 0-->not at all   Feeling down, depressed, or hopeless? 0-->not at all   Trouble falling or staying asleep, or sleeping too much?     Feeling tired or having little energy?     Poor appetite or overeating?     Feeling bad about yourself - or that you are a failure or have let yourself or your family down?     Trouble concentrating on things, such as reading the newspaper or watching television?     Moving or speaking so slowly that other people could have noticed? Or the opposite - being so fidgety or restless that you have been moving around a lot more than usual?     Thoughts that you would be better off dead, or of hurting yourself in some way?     PHQ-9 Total Score 0   If you checked off any problems, how difficult have these problems made it for you to do your work, take care of things at home, or get along with other people?       PHQ-9 Total Score: 0      Patient screened positive for depression based on a PHQ-9 score of 0 on 8/9/2022. Follow-up recommendations include:          Current Outpatient Medications:   •  Cyanocobalamin (VITAMIN B 12) 500 MCG tablet, Take 500 mcg by mouth Daily., Disp: , Rfl:   •  diclofenac (VOLTAREN) 75 MG EC tablet, TAKE 1 TABLET BY MOUTH TWICE DAILY, Disp: 180 tablet, Rfl: 0  •  Ozempic, 1 MG/DOSE, 4 MG/3ML solution pen-injector, INJECT 1 MG UNDER THE SKIN ON THE SAME  DAY OF EACH WEEK, Disp: 3 mL, Rfl: 1  •  traZODone (DESYREL) 50 MG tablet, Take 50 mg by mouth Every Night., Disp: , Rfl:   •  valsartan-hydrochlorothiazide (DIOVAN-HCT) 320-25 MG per tablet, Take 1 tablet by mouth Daily., Disp: , Rfl:    (Not in a hospital admission)     Allergies: Patient has no known allergies.    Physical Exam  Constitutional:       Appearance: Normal appearance. He is obese.   HENT:      Head: Normocephalic.      Right Ear: Tympanic membrane, ear canal and external ear normal.      Left Ear: Tympanic membrane and ear canal normal.      Nose: Nose normal.      Mouth/Throat:      Mouth: Mucous membranes are moist.      Pharynx: Oropharynx is clear.   Eyes:      Extraocular Movements: Extraocular movements intact.      Conjunctiva/sclera: Conjunctivae normal.      Pupils: Pupils are equal, round, and reactive to light.   Cardiovascular:      Rate and Rhythm: Normal rate and regular rhythm.      Heart sounds: Normal heart sounds.   Pulmonary:      Effort: Pulmonary effort is normal.      Breath sounds: Normal breath sounds.   Abdominal:      General: Abdomen is flat. Bowel sounds are normal.      Palpations: Abdomen is soft.   Musculoskeletal:         General: Normal range of motion.      Cervical back: Normal range of motion.   Skin:         Neurological:      General: No focal deficit present.      Mental Status: He is alert and oriented to person, place, and time. Mental status is at baseline.   Psychiatric:         Mood and Affect: Mood normal.         Behavior: Behavior normal.         Thought Content: Thought content normal.         Judgment: Judgment normal.          Result Review :                   Assessment and Plan    Diagnoses and all orders for this visit:    1. Annual physical exam (Primary)  Assessment & Plan:  Fasting labs drawn.  UA completed.  Proper diet and exercise plan discussed and encouraged.  Goal blood pressure less than 140/90.  Let me know if gets to or above that.   Check feet daily.  Annual dental and eye exams encouraged.  Discussed shingles and tetanus vaccine with the pharmacist.  Colonoscopy up-to-date.  Risk of meds discussed and understood.  Education provided.  Return to clinic or ED with any issues or concerns.      2. Benign essential HTN  -     CBC & Differential; Future  -     Comprehensive Metabolic Panel; Future  -     TSH Rfx On Abnormal To Free T4; Future  -     Lipid Panel; Future  -     POC Urinalysis Dipstick, Automated; Future  -     CBC & Differential  -     Comprehensive Metabolic Panel  -     TSH Rfx On Abnormal To Free T4  -     Lipid Panel    3. Mixed hyperlipidemia    4. Neck mass  Assessment & Plan:  Will refer to general surgery for further evaluation.    Orders:  -     Ambulatory Referral to General Surgery    5. Type 2 diabetes mellitus without complication, without long-term current use of insulin (HCC)  -     Hemoglobin A1c; Future  -     POC Microalbumin; Future  -     Hemoglobin A1c    6. Screening for prostate cancer  -     PSA Total, Reflex To Free; Future  -     PSA Total, Reflex To Free                   Follow Up   Return in about 6 months (around 2/9/2023).  Patient was given instructions and counseling regarding his condition or for health maintenance advice. Please see specific information pulled into the AVS if appropriate.     REGINALD Sanchez

## 2022-08-09 NOTE — ASSESSMENT & PLAN NOTE
Fasting labs drawn.  UA completed.  Proper diet and exercise plan discussed and encouraged.  Goal blood pressure less than 140/90.  Let me know if gets to or above that.  Check feet daily.  Annual dental and eye exams encouraged.  Discussed shingles and tetanus vaccine with the pharmacist.  Colonoscopy up-to-date.  Risk of meds discussed and understood.  Education provided.  Return to clinic or ED with any issues or concerns.

## 2022-08-10 LAB
ALBUMIN SERPL-MCNC: 4.4 G/DL (ref 3.8–4.9)
ALBUMIN/GLOB SERPL: 1.6 {RATIO} (ref 1.2–2.2)
ALP SERPL-CCNC: 75 IU/L (ref 44–121)
ALT SERPL-CCNC: 38 IU/L (ref 0–44)
AST SERPL-CCNC: 35 IU/L (ref 0–40)
BASOPHILS # BLD AUTO: 0 X10E3/UL (ref 0–0.2)
BASOPHILS NFR BLD AUTO: 1 %
BILIRUB SERPL-MCNC: 0.6 MG/DL (ref 0–1.2)
BUN SERPL-MCNC: 19 MG/DL (ref 6–24)
BUN/CREAT SERPL: 17 (ref 9–20)
CALCIUM SERPL-MCNC: 9.1 MG/DL (ref 8.7–10.2)
CHLORIDE SERPL-SCNC: 104 MMOL/L (ref 96–106)
CHOLEST SERPL-MCNC: 118 MG/DL (ref 100–199)
CO2 SERPL-SCNC: 23 MMOL/L (ref 20–29)
CREAT SERPL-MCNC: 1.14 MG/DL (ref 0.76–1.27)
EGFRCR SERPLBLD CKD-EPI 2021: 77 ML/MIN/1.73
EOSINOPHIL # BLD AUTO: 0.4 X10E3/UL (ref 0–0.4)
EOSINOPHIL NFR BLD AUTO: 5 %
ERYTHROCYTE [DISTWIDTH] IN BLOOD BY AUTOMATED COUNT: 12.6 % (ref 11.6–15.4)
GLOBULIN SER CALC-MCNC: 2.8 G/DL (ref 1.5–4.5)
GLUCOSE SERPL-MCNC: 97 MG/DL (ref 65–99)
HBA1C MFR BLD: 5.8 % (ref 4.8–5.6)
HCT VFR BLD AUTO: 45.7 % (ref 37.5–51)
HDLC SERPL-MCNC: 45 MG/DL
HGB BLD-MCNC: 15.6 G/DL (ref 13–17.7)
IMM GRANULOCYTES # BLD AUTO: 0 X10E3/UL (ref 0–0.1)
IMM GRANULOCYTES NFR BLD AUTO: 0 %
LDLC SERPL CALC-MCNC: 61 MG/DL (ref 0–99)
LYMPHOCYTES # BLD AUTO: 1.8 X10E3/UL (ref 0.7–3.1)
LYMPHOCYTES NFR BLD AUTO: 21 %
MCH RBC QN AUTO: 31.1 PG (ref 26.6–33)
MCHC RBC AUTO-ENTMCNC: 34.1 G/DL (ref 31.5–35.7)
MCV RBC AUTO: 91 FL (ref 79–97)
MONOCYTES # BLD AUTO: 0.7 X10E3/UL (ref 0.1–0.9)
MONOCYTES NFR BLD AUTO: 8 %
NEUTROPHILS # BLD AUTO: 5.8 X10E3/UL (ref 1.4–7)
NEUTROPHILS NFR BLD AUTO: 65 %
PLATELET # BLD AUTO: 241 X10E3/UL (ref 150–450)
POTASSIUM SERPL-SCNC: 4.5 MMOL/L (ref 3.5–5.2)
PROT SERPL-MCNC: 7.2 G/DL (ref 6–8.5)
PSA SERPL-MCNC: 0.8 NG/ML (ref 0–4)
RBC # BLD AUTO: 5.01 X10E6/UL (ref 4.14–5.8)
REFLEX: NORMAL
SODIUM SERPL-SCNC: 142 MMOL/L (ref 134–144)
TRIGL SERPL-MCNC: 55 MG/DL (ref 0–149)
TSH SERPL DL<=0.005 MIU/L-ACNC: 1.54 UIU/ML (ref 0.45–4.5)
VLDLC SERPL CALC-MCNC: 12 MG/DL (ref 5–40)
WBC # BLD AUTO: 8.7 X10E3/UL (ref 3.4–10.8)

## 2022-08-18 RX ORDER — DICLOFENAC SODIUM 75 MG/1
TABLET, DELAYED RELEASE ORAL
Qty: 180 TABLET | Refills: 0 | Status: SHIPPED | OUTPATIENT
Start: 2022-08-18 | End: 2022-12-22

## 2022-08-30 RX ORDER — FAMOTIDINE 40 MG/1
TABLET, FILM COATED ORAL
Qty: 90 TABLET | Refills: 0 | Status: SHIPPED | OUTPATIENT
Start: 2022-08-30 | End: 2022-12-05

## 2022-09-06 RX ORDER — AMLODIPINE BESYLATE 10 MG/1
TABLET ORAL
Qty: 30 TABLET | Refills: 0 | Status: SHIPPED | OUTPATIENT
Start: 2022-09-06 | End: 2022-09-06

## 2022-09-06 RX ORDER — AMLODIPINE BESYLATE 10 MG/1
TABLET ORAL
Qty: 90 TABLET | Refills: 0 | Status: SHIPPED | OUTPATIENT
Start: 2022-09-06 | End: 2022-12-10

## 2022-09-22 RX ORDER — SEMAGLUTIDE 1.34 MG/ML
INJECTION, SOLUTION SUBCUTANEOUS
Qty: 3 ML | Refills: 1 | Status: SHIPPED | OUTPATIENT
Start: 2022-09-22 | End: 2022-11-14

## 2022-10-19 ENCOUNTER — TELEPHONE (OUTPATIENT)
Dept: FAMILY MEDICINE CLINIC | Facility: CLINIC | Age: 52
End: 2022-10-19

## 2022-11-08 ENCOUNTER — TELEPHONE (OUTPATIENT)
Dept: FAMILY MEDICINE CLINIC | Facility: CLINIC | Age: 52
End: 2022-11-08

## 2022-11-08 NOTE — TELEPHONE ENCOUNTER
Alberto said he would have to see Lucero Lang to discuss phentermine(weight loss drug) he's saying Dr Lang to the [facility], that's why they can't get him in, Can you please get him an appt?

## 2022-11-08 NOTE — TELEPHONE ENCOUNTER
Caller: Booker De Oliveira    Relationship to patient: Self    Best call back number: [phone]  Chief complaint: LEG PAIN AND NEEDS TO BE PUT BACK ON PHENTERMNE         Type of visit: OFFICE   Requested date: AS SOON AS POSSIBLE ON MON OR Tuesday PREFERABLY       Additional notes:PATIENT SAID THAT DR SINGH SAID HE WOULD HAVE TO HAVE A DR NOT A APRN TO PRESCRIBE  PHENTERMNE AND THAT HE COULD SEE DR JAFFE TO DO THAT. NEITHER DR HAVE APPOINTMENTS THIS MONTH. WANTS TO SEE IF HE COULD BE SEEN BY ANOTHER MD IN PRACTICE TO GET THE MEDICATION STARTED. PLEASE CALL PATIENT BACK TO LET HIM KNOW IF THIS IS POSSIBLE.

## 2022-11-09 NOTE — TELEPHONE ENCOUNTER
[facility] TO READ: CALLED THE PATIENT AND LVM TO GET HIM SCHEDULED WITH JOHNATHAN JAFFE TO GET BACK ON THE MEDICATION HE IS REQUESTING.

## 2022-11-09 NOTE — TELEPHONE ENCOUNTER
[facility] RELAYED MESSAGE AND PATIENT VERBALIZED UNDERSTANDING AND SCHEDULED AN APPOINTMENT WITH JOHNATHAN JAFFE FOR 12-5-22 AT 8:15

## 2022-11-14 RX ORDER — SEMAGLUTIDE 1.34 MG/ML
INJECTION, SOLUTION SUBCUTANEOUS
Qty: 3 ML | Refills: 1 | Status: SHIPPED | OUTPATIENT
Start: 2022-11-14 | End: 2022-12-05

## 2022-11-14 RX ORDER — VALSARTAN AND HYDROCHLOROTHIAZIDE 320; 25 MG/1; MG/1
TABLET, FILM COATED ORAL
Qty: 30 TABLET | Refills: 0 | Status: SHIPPED | OUTPATIENT
Start: 2022-11-14 | End: 2022-12-20

## 2022-11-15 RX ORDER — VALSARTAN AND HYDROCHLOROTHIAZIDE 320; 25 MG/1; MG/1
TABLET, FILM COATED ORAL
Qty: 90 TABLET | OUTPATIENT
Start: 2022-11-15

## 2022-11-15 RX ORDER — SEMAGLUTIDE 1.34 MG/ML
INJECTION, SOLUTION SUBCUTANEOUS
Qty: 3 ML | Refills: 1 | OUTPATIENT
Start: 2022-11-15

## 2022-12-05 ENCOUNTER — OFFICE VISIT (OUTPATIENT)
Dept: FAMILY MEDICINE CLINIC | Facility: CLINIC | Age: 52
End: 2022-12-05

## 2022-12-05 VITALS
OXYGEN SATURATION: 96 % | HEIGHT: 72 IN | SYSTOLIC BLOOD PRESSURE: 140 MMHG | DIASTOLIC BLOOD PRESSURE: 88 MMHG | WEIGHT: 315 LBS | BODY MASS INDEX: 42.66 KG/M2 | HEART RATE: 60 BPM

## 2022-12-05 DIAGNOSIS — M54.50 LUMBAR PAIN: Primary | ICD-10-CM

## 2022-12-05 DIAGNOSIS — R10.30 LOWER ABDOMINAL PAIN: ICD-10-CM

## 2022-12-05 DIAGNOSIS — E11.9 TYPE 2 DIABETES MELLITUS WITHOUT COMPLICATION, WITHOUT LONG-TERM CURRENT USE OF INSULIN: ICD-10-CM

## 2022-12-05 DIAGNOSIS — Z71.3 DIETARY COUNSELING AND SURVEILLANCE: ICD-10-CM

## 2022-12-05 PROCEDURE — 99214 OFFICE O/P EST MOD 30 MIN: CPT | Performed by: NURSE PRACTITIONER

## 2022-12-05 RX ORDER — PHENTERMINE HYDROCHLORIDE 37.5 MG/1
37.5 TABLET ORAL
Qty: 30 TABLET | Refills: 0 | Status: SHIPPED | OUTPATIENT
Start: 2022-12-05 | End: 2023-01-05 | Stop reason: SDUPTHER

## 2022-12-05 RX ORDER — TIRZEPATIDE 5 MG/.5ML
5 INJECTION, SOLUTION SUBCUTANEOUS WEEKLY
Qty: 2 ML | Refills: 2 | Status: SHIPPED | OUTPATIENT
Start: 2022-12-05 | End: 2023-01-05

## 2022-12-05 NOTE — PROGRESS NOTES
"Chief Complaint  weight management, stomach issues , and Leg Pain    Subjective          Booker De Oliveira presents to [facility]  History of Present Illness  Patient has had low back pain that radiates down his left leg for several years.  He has had intermittent abdominal pain and cramping.  He denies fever, chills, myalgias.  He would like to restart phentermine for weight loss.  He has tried diet and exercise alone without success.  He states his diabetes is controlled.      Objective   Vital Signs:   /88   Pulse 60   Ht 182.9 cm (72\")   Wt (!) 147 kg (324 lb)   SpO2 96%   BMI 43.94 kg/m²     Body mass index is 43.94 kg/m².    Review of Systems   Constitutional: Negative for fatigue and fever.   Respiratory: Negative for shortness of breath.    Cardiovascular: Negative for chest pain, palpitations and leg swelling.   Gastrointestinal: Positive for abdominal pain.   Musculoskeletal: Positive for back pain.   Neurological: Negative for syncope.   Psychiatric/Behavioral: The patient is not nervous/anxious.           Current Outpatient Medications:   •  amLODIPine (NORVASC) 10 MG tablet, TAKE 1 TABLET BY MOUTH EVERY DAY, Disp: 90 tablet, Rfl: 0  •  Cyanocobalamin (VITAMIN B 12) 500 MCG tablet, Take 500 mcg by mouth Daily., Disp: , Rfl:   •  diclofenac (VOLTAREN) 75 MG EC tablet, TAKE 1 TABLET BY MOUTH TWICE DAILY, Disp: 180 tablet, Rfl: 0  •  traZODone (DESYREL) 50 MG tablet, Take 50 mg by mouth Every Night., Disp: , Rfl:   •  valsartan-hydrochlorothiazide (DIOVAN-HCT) 320-25 MG per tablet, TAKE 1 TABLET BY MOUTH EVERY DAY, Disp: 30 tablet, Rfl: 0  •  phentermine (Adipex-P) 37.5 MG tablet, Take 1 tablet by mouth Every Morning Before Breakfast., Disp: 30 tablet, Rfl: 0  •  Tirzepatide (Mounjaro) 5 MG/0.5ML solution pen-injector, Inject 0.5 mL under the skin into the appropriate area as directed 1 (One) Time Per Week., Disp: 2 mL, Rfl: 2      Allergies: Patient has no known " allergies.    Physical Exam  Constitutional:       Appearance: Normal appearance.   HENT:      Head: Normocephalic.   Eyes:      Conjunctiva/sclera: Conjunctivae normal.      Pupils: Pupils are equal, round, and reactive to light.   Cardiovascular:      Rate and Rhythm: Normal rate and regular rhythm.      Heart sounds: Normal heart sounds.   Pulmonary:      Effort: Pulmonary effort is normal.      Breath sounds: Normal breath sounds.   Abdominal:      Tenderness: There is no abdominal tenderness.   Musculoskeletal:         General: Normal range of motion.      Comments: Lumbar tenderness   Skin:     General: Skin is warm and dry.      Capillary Refill: Capillary refill takes less than 2 seconds.   Neurological:      General: No focal deficit present.      Mental Status: He is alert and oriented to person, place, and time.   Psychiatric:         Mood and Affect: Mood normal.         Behavior: Behavior normal.         Thought Content: Thought content normal.         Judgment: Judgment normal.          Result Review :                   Assessment and Plan    Diagnoses and all orders for this visit:    1. Lumbar pain (Primary)  Comments:  X-rays/MRI ordered.  We will refer to neurosurgery pending results.  Apply ice to painful areas and ROM stretching.  I offered PT  Orders:  -     XR Spine Lumbar 2 or 3 View; Future  -     MRI Lumbar Spine Without Contrast; Future    2. Lower abdominal pain  Comments:  Return or go to the ER for worsening symptoms. we will change Ozempic and see if this helps.    3. Dietary counseling and surveillance  Comments:  Restart phentermine.  1200/day diet and exercise 3 to 5 days/week.  Controlled subs agreement verbalized.F/U in 1 week.  Orders:  -     phentermine (Adipex-P) 37.5 MG tablet; Take 1 tablet by mouth Every Morning Before Breakfast.  Dispense: 30 tablet; Refill: 0    4. Type 2 diabetes mellitus without complication, without long-term current use of insulin  (formerly [facility])  Comments:  Change Ozempic to Mounjaro.  Orders:  -     Tirzepatide (Mounjaro) 5 MG/0.5ML solution pen-injector; Inject 0.5 mL under the skin into the appropriate area as directed 1 (One) Time Per Week.  Dispense: 2 mL; Refill: 2                Follow Up   Return in about 1 month (around 1/5/2023) for Recheck.  Patient was given instructions and counseling regarding his condition or for health maintenance advice. Please see specific information pulled into the AVS if appropriate.     REGINALD Sandoval

## 2022-12-07 ENCOUNTER — PATIENT MESSAGE (OUTPATIENT)
Dept: FAMILY MEDICINE CLINIC | Facility: CLINIC | Age: 52
End: 2022-12-07

## 2022-12-10 RX ORDER — AMLODIPINE BESYLATE 10 MG/1
TABLET ORAL
Qty: 90 TABLET | Refills: 0 | Status: SHIPPED | OUTPATIENT
Start: 2022-12-10 | End: 2023-03-20

## 2022-12-16 ENCOUNTER — TELEPHONE (OUTPATIENT)
Dept: FAMILY MEDICINE CLINIC | Facility: CLINIC | Age: 52
End: 2022-12-16

## 2022-12-20 RX ORDER — VALSARTAN AND HYDROCHLOROTHIAZIDE 320; 25 MG/1; MG/1
TABLET, FILM COATED ORAL
Qty: 30 TABLET | Refills: 0 | Status: SHIPPED | OUTPATIENT
Start: 2022-12-20 | End: 2023-01-21

## 2022-12-20 RX ORDER — VALSARTAN AND HYDROCHLOROTHIAZIDE 320; 25 MG/1; MG/1
TABLET, FILM COATED ORAL
Qty: 90 TABLET | OUTPATIENT
Start: 2022-12-20

## 2022-12-22 RX ORDER — DICLOFENAC SODIUM 75 MG/1
TABLET, DELAYED RELEASE ORAL
Qty: 60 TABLET | Refills: 0 | Status: SHIPPED | OUTPATIENT
Start: 2022-12-22 | End: 2023-01-23

## 2023-01-05 ENCOUNTER — OFFICE VISIT (OUTPATIENT)
Dept: FAMILY MEDICINE CLINIC | Facility: CLINIC | Age: 53
End: 2023-01-05
Payer: COMMERCIAL

## 2023-01-05 ENCOUNTER — PATIENT MESSAGE (OUTPATIENT)
Dept: FAMILY MEDICINE CLINIC | Facility: CLINIC | Age: 53
End: 2023-01-05

## 2023-01-05 VITALS
OXYGEN SATURATION: 97 % | HEIGHT: 73 IN | SYSTOLIC BLOOD PRESSURE: 120 MMHG | BODY MASS INDEX: 40.82 KG/M2 | WEIGHT: 308 LBS | HEART RATE: 77 BPM | DIASTOLIC BLOOD PRESSURE: 84 MMHG

## 2023-01-05 DIAGNOSIS — Z71.3 DIETARY COUNSELING AND SURVEILLANCE: ICD-10-CM

## 2023-01-05 DIAGNOSIS — E11.9 TYPE 2 DIABETES MELLITUS WITHOUT COMPLICATION, WITHOUT LONG-TERM CURRENT USE OF INSULIN: ICD-10-CM

## 2023-01-05 PROCEDURE — 99213 OFFICE O/P EST LOW 20 MIN: CPT | Performed by: NURSE PRACTITIONER

## 2023-01-05 RX ORDER — TIRZEPATIDE 7.5 MG/.5ML
7.5 INJECTION, SOLUTION SUBCUTANEOUS WEEKLY
Qty: 2 ML | Refills: 2 | Status: SHIPPED | OUTPATIENT
Start: 2023-01-05 | End: 2023-01-29 | Stop reason: SDUPTHER

## 2023-01-05 RX ORDER — PHENTERMINE HYDROCHLORIDE 37.5 MG/1
37.5 TABLET ORAL
Qty: 30 TABLET | Refills: 0 | Status: SHIPPED | OUTPATIENT
Start: 2023-01-05 | End: 2023-02-03 | Stop reason: SDUPTHER

## 2023-01-05 NOTE — PROGRESS NOTES
Chief Complaint  weight management    Subjective          Booker De Oliveira presents to [facility]  History of Present Illness  Pt is here to follow up on DM and weight management. He has been taking Mounjaro and doing well. He is taking Phentermine and doing well. He denies significant side effects.       Objective   Vital Signs:   /84   Pulse 77   Ht 185.4 cm (73\")   Wt (!) 140 kg (308 lb)   SpO2 97%   BMI 40.64 kg/m²     Body mass index is 40.64 kg/m².    Review of Systems   Constitutional: Negative for fatigue and fever.   Respiratory: Negative for shortness of breath.    Cardiovascular: Negative for chest pain, palpitations and leg swelling.   Neurological: Negative for syncope.   Psychiatric/Behavioral: The patient is not nervous/anxious.           Current Outpatient Medications:   •  amLODIPine (NORVASC) 10 MG tablet, TAKE 1 TABLET BY MOUTH EVERY DAY, Disp: 90 tablet, Rfl: 0  •  Cyanocobalamin (VITAMIN B 12) 500 MCG tablet, Take 500 mcg by mouth Daily., Disp: , Rfl:   •  diclofenac (VOLTAREN) 75 MG EC tablet, TAKE 1 TABLET BY MOUTH TWICE DAILY, Disp: 60 tablet, Rfl: 0  •  phentermine (Adipex-P) 37.5 MG tablet, Take 1 tablet by mouth Every Morning Before Breakfast., Disp: 30 tablet, Rfl: 0  •  traZODone (DESYREL) 50 MG tablet, Take 50 mg by mouth Every Night., Disp: , Rfl:   •  valsartan-hydrochlorothiazide (DIOVAN-HCT) 320-25 MG per tablet, TAKE 1 TABLET BY MOUTH EVERY DAY, Disp: 30 tablet, Rfl: 0  •  Tirzepatide (Mounjaro) 7.5 MG/0.5ML solution pen-injector, Inject 7.5 mg under the skin into the appropriate area as directed 1 (One) Time Per Week., Disp: 2 mL, Rfl: 2      Allergies: Patient has no known allergies.    Physical Exam  Constitutional:       Appearance: Normal appearance.   HENT:      Head: Normocephalic.   Eyes:      Conjunctiva/sclera: Conjunctivae normal.      Pupils: Pupils are equal, round, and reactive to light.   Cardiovascular:      Rate and Rhythm:  Normal rate and regular rhythm.      Heart sounds: Normal heart sounds.   Pulmonary:      Effort: Pulmonary effort is normal.      Breath sounds: Normal breath sounds.   Abdominal:      Tenderness: There is no abdominal tenderness.   Musculoskeletal:         General: Normal range of motion.   Skin:     General: Skin is warm and dry.      Capillary Refill: Capillary refill takes less than 2 seconds.   Neurological:      General: No focal deficit present.      Mental Status: He is alert and oriented to person, place, and time.   Psychiatric:         Mood and Affect: Mood normal.         Behavior: Behavior normal.         Thought Content: Thought content normal.         Judgment: Judgment normal.          Result Review :                   Assessment and Plan    Diagnoses and all orders for this visit:    1. Type 2 diabetes mellitus without complication, without long-term current use of insulin (Former[facility])  Comments:  Raise dose of Mounjaro to 7.5mg.   Orders:  -     Tirzepatide (Mounjaro) 7.5 MG/0.5ML solution pen-injector; Inject 7.5 mg under the skin into the appropriate area as directed 1 (One) Time Per Week.  Dispense: 2 mL; Refill: 2    2. Dietary counseling and surveillance  Comments:  Continue phentermine.  1200/day diet and exercise 3 to 5 days/week.  Controlled subs agreement verbalized.F/U in 1 week.  Orders:  -     phentermine (Adipex-P) 37.5 MG tablet; Take 1 tablet by mouth Every Morning Before Breakfast.  Dispense: 30 tablet; Refill: 0                Follow Up   Return in about 1 month (around 2/5/2023) for Recheck.  Patient was given instructions and counseling regarding his condition or for health maintenance advice. Please see specific information pulled into the AVS if appropriate.     REGINALD Sandoval

## 2023-01-16 ENCOUNTER — PATIENT MESSAGE (OUTPATIENT)
Dept: FAMILY MEDICINE CLINIC | Facility: CLINIC | Age: 53
End: 2023-01-16
Payer: COMMERCIAL

## 2023-01-16 RX ORDER — VALSARTAN 320 MG/1
TABLET ORAL
Qty: 30 TABLET | Refills: 0 | OUTPATIENT
Start: 2023-01-16

## 2023-01-21 RX ORDER — VALSARTAN AND HYDROCHLOROTHIAZIDE 320; 25 MG/1; MG/1
TABLET, FILM COATED ORAL
Qty: 30 TABLET | Refills: 0 | Status: SHIPPED | OUTPATIENT
Start: 2023-01-21 | End: 2023-02-17

## 2023-01-23 RX ORDER — DICLOFENAC SODIUM 75 MG/1
TABLET, DELAYED RELEASE ORAL
Qty: 60 TABLET | Refills: 0 | Status: SHIPPED | OUTPATIENT
Start: 2023-01-23 | End: 2023-02-17

## 2023-01-29 ENCOUNTER — PATIENT MESSAGE (OUTPATIENT)
Dept: FAMILY MEDICINE CLINIC | Facility: CLINIC | Age: 53
End: 2023-01-29
Payer: COMMERCIAL

## 2023-01-29 DIAGNOSIS — E11.9 TYPE 2 DIABETES MELLITUS WITHOUT COMPLICATION, WITHOUT LONG-TERM CURRENT USE OF INSULIN: ICD-10-CM

## 2023-01-30 RX ORDER — TIRZEPATIDE 7.5 MG/.5ML
7.5 INJECTION, SOLUTION SUBCUTANEOUS WEEKLY
Qty: 2 ML | Refills: 2 | Status: SHIPPED | OUTPATIENT
Start: 2023-01-30 | End: 2023-03-06

## 2023-02-03 ENCOUNTER — OFFICE VISIT (OUTPATIENT)
Dept: FAMILY MEDICINE CLINIC | Facility: CLINIC | Age: 53
End: 2023-02-03
Payer: COMMERCIAL

## 2023-02-03 VITALS
SYSTOLIC BLOOD PRESSURE: 124 MMHG | WEIGHT: 298 LBS | HEART RATE: 72 BPM | DIASTOLIC BLOOD PRESSURE: 88 MMHG | OXYGEN SATURATION: 99 % | BODY MASS INDEX: 39.49 KG/M2 | HEIGHT: 73 IN

## 2023-02-03 DIAGNOSIS — K59.09 CHRONIC CONSTIPATION: ICD-10-CM

## 2023-02-03 DIAGNOSIS — R21 RASH: ICD-10-CM

## 2023-02-03 DIAGNOSIS — E11.9 TYPE 2 DIABETES MELLITUS WITHOUT COMPLICATION, WITHOUT LONG-TERM CURRENT USE OF INSULIN: ICD-10-CM

## 2023-02-03 DIAGNOSIS — Z71.3 DIETARY COUNSELING AND SURVEILLANCE: ICD-10-CM

## 2023-02-03 DIAGNOSIS — M54.50 LUMBAR PAIN: Primary | ICD-10-CM

## 2023-02-03 PROCEDURE — 99214 OFFICE O/P EST MOD 30 MIN: CPT | Performed by: NURSE PRACTITIONER

## 2023-02-03 RX ORDER — PLECANATIDE 3 MG/1
3 TABLET ORAL WEEKLY
Qty: 90 TABLET | Refills: 3 | Status: SHIPPED | OUTPATIENT
Start: 2023-02-03 | End: 2023-02-10

## 2023-02-03 RX ORDER — PHENTERMINE HYDROCHLORIDE 37.5 MG/1
37.5 TABLET ORAL
Qty: 30 TABLET | Refills: 0 | Status: SHIPPED | OUTPATIENT
Start: 2023-02-03 | End: 2023-03-06 | Stop reason: SDUPTHER

## 2023-02-04 ENCOUNTER — PATIENT MESSAGE (OUTPATIENT)
Dept: FAMILY MEDICINE CLINIC | Facility: CLINIC | Age: 53
End: 2023-02-04
Payer: COMMERCIAL

## 2023-02-09 RX ORDER — VALSARTAN AND HYDROCHLOROTHIAZIDE 320; 25 MG/1; MG/1
TABLET, FILM COATED ORAL
Qty: 90 TABLET | OUTPATIENT
Start: 2023-02-09

## 2023-02-14 NOTE — TELEPHONE ENCOUNTER
linzess is formulary. I called and gave a coupon card for linzess and it came back still $1100. Pharmacy states he has a high deductable plan and even the formularies are expensive. Do you want to give him samples until he reaches his deductable or is there something else he can do?

## 2023-02-15 ENCOUNTER — PATIENT MESSAGE (OUTPATIENT)
Dept: FAMILY MEDICINE CLINIC | Facility: CLINIC | Age: 53
End: 2023-02-15
Payer: COMMERCIAL

## 2023-02-17 RX ORDER — DICLOFENAC SODIUM 75 MG/1
TABLET, DELAYED RELEASE ORAL
Qty: 60 TABLET | Refills: 0 | Status: SHIPPED | OUTPATIENT
Start: 2023-02-17 | End: 2023-03-20

## 2023-02-17 RX ORDER — VALSARTAN AND HYDROCHLOROTHIAZIDE 320; 25 MG/1; MG/1
TABLET, FILM COATED ORAL
Qty: 90 TABLET | Refills: 0 | Status: SHIPPED | OUTPATIENT
Start: 2023-02-17

## 2023-02-19 RX ORDER — LACTULOSE 10 G/15ML
20 SOLUTION ORAL 2 TIMES DAILY PRN
Qty: 473 ML | Refills: 0 | Status: SHIPPED | OUTPATIENT
Start: 2023-02-19

## 2023-03-01 NOTE — PROGRESS NOTES
"Subjective   History of Present Illness: Booker De Oliveira is a 52 y.o. male is being seen for consultation today at the request of REGINALD Webber for mild back pain that radiates down the left leg. Patient reports that the pain is the most intense in the left hip down to the left knee and slightly below into the shin. Patient denies any numbness, weakness or tingling.  He states the pain in the left hip and leg occurs when he sitting in a recliner or sometimes when driving.  Patient reports that he can walk or stand without any painful limitations.    While in the room and during my examination of the patient I wore a mask and eye protection.  I washed my hands before and after this patient encounter.  The patient was also wearing a mask.    History of Present Illness    Tobacco Use: Low Risk    • Smoking Tobacco Use: Never   • Smokeless Tobacco Use: Never   • Passive Exposure: Not on file        The following portions of the patient's history were reviewed and updated as appropriate: allergies, current medications, past family history, past medical history, past social history, past surgical history and problem list.    Review of Systems    Objective     Vitals:    03/02/23 0940   BP: 110/78   Pulse: 64   SpO2: 99%   Weight: 135 kg (298 lb)   Height: 185.4 cm (72.99\")     Body mass index is 39.32 kg/m².      Physical Exam  Neurologic Exam    Physical Exam:    CONSTITUTIONAL: This 52 year old  male appears well developed, well-nourished and in no acute distress.    HEAD & FACE: the head and face are symmetric, normocephalic and atraumatic.    EYES: Inspection of the conjunctivae and lids reveals no swelling, erythema or discharge.  Pupils are round, equal and reactive to light and there is no scleral icterus.    EARS, NOSE, MOUTH & THROAT: On inspection, the ears and nose are within normal limits.    NECK: the neck is supple and symmetric. The trachea is midline with no masses.    PULMONARY: " Respiratory effort is normal with no increased work of breathing or signs of respiratory distress.    CARDIOVASCULAR: Pedal pulses are +1/4 bilaterally. Examination of the extremities shows no edema or varicosities.    MUSCULOSKELETAL: Gait and station are within normal limits. The spine has normal alignment and range of motion.    SKIN: The skin is warm, dry and intact    NEUROLOGIC:   Cranial Nerves 2-12 intact  Normal motor strength noted to confrontational testing of both lower extremities. Muscle bulk and tone are normal.  Sensory exam is normal to fine touch to confrontational testing bilaterally  Reflexes on the right side demonstrates 0/4 Knee Jerk Reflex, 0/4 Ankle Jerk Reflex and no ankle clonus on the right.   Reflexes on the left side demonstrates 0/4 Knee Jerk Reflex, 0/4 Ankle Jerk Reflex and no ankle clonus on the left.  Superficial/Primitive Reflexes: primitive reflexes were absent.  Ramírez's, Babinski, and Clonus signs all negative.  No coordination deficit observed.  Radicular testing showed a negative Hubert (VINICIUS) test and negative straight leg raise.  He does get a little musculoskeletal pain of the left knee with straight leg raising test.  Cortical function is intact and without deficits. Speech is normal.    PSYCHIATRIC: oriented to person, place and time. Patient's mood and affect are normal.    Assessment & Plan   Independent Review of Radiographic Studies:      I personally reviewed the images from the following studies.    Lumbar radiographs done on December 5, 2020 to [facility] reveals mild to moderate degenerative disc disease throughout the lumbar spine.  Fairly large osteophytes noted gaited in the lower thoracic and upper lumbar spine with facet degenerative change noted in the lower lumbar spine.    Lumbar MRI done at [facility] on December 22, 2022 reveals degenerative changes of the lumbar spine tribute into canal stenosis most obvious at L3-4 described as  moderate.  Neuroforaminal narrowing is most obvious to the left at L4-5.    Medical Decision Making:      Patient has intermittent symptoms in the left lower extremity that may be radicular nature although he does not have any firm radiculopathy on clinical exam.  He certainly does not have neurogenic claudication to correlate with the lumbar stenosis at L3-4 and L4-5.  Given his pattern of symptomatology he may be an excellent candidate for interventional pain management techniques on the lumbar spine.  He would like to try that in an effort to try to avoid surgery.  If he does not respond favorably to interventional pain management and has progressive left lower extremity pain, numbness or weakness a lumbar decompression from L3-L5 may need to be considered for the degree of stenosis found.This is a difficult situation as research suggests people with a BMI of greater than 40 have a higher than average rate of failed back surgery, and this patient's BMI is Body mass index is 39.32 kg/m².  He has lost a considerable amount of weight since he saw Dr. Turner 4 years ago but he still is in the category of morbid obesity which would increase the risk of lumbar surgery although his body mass index is not prohibitive of surgery at this time.    Return if left lower extremity symptoms do not respond to interventional pain management or worsen.    Diagnoses and all orders for this visit:    1. Chronic left-sided low back pain with left-sided sciatica (Primary)  -     Ambulatory Referral to Pain Management    2. Spinal stenosis, lumbar region, without neurogenic claudication  -     Ambulatory Referral to Pain Management    3. BMI 39.0-39.9,adult             Juan Miguel Charles MD FACS FAANS  Neurological Surgery

## 2023-03-02 ENCOUNTER — OFFICE VISIT (OUTPATIENT)
Dept: NEUROSURGERY | Facility: CLINIC | Age: 53
End: 2023-03-02
Payer: COMMERCIAL

## 2023-03-02 VITALS
SYSTOLIC BLOOD PRESSURE: 110 MMHG | HEIGHT: 73 IN | OXYGEN SATURATION: 99 % | DIASTOLIC BLOOD PRESSURE: 78 MMHG | WEIGHT: 298 LBS | HEART RATE: 64 BPM | BODY MASS INDEX: 39.49 KG/M2

## 2023-03-02 DIAGNOSIS — M48.061 SPINAL STENOSIS, LUMBAR REGION, WITHOUT NEUROGENIC CLAUDICATION: ICD-10-CM

## 2023-03-02 DIAGNOSIS — G89.29 CHRONIC LEFT-SIDED LOW BACK PAIN WITH LEFT-SIDED SCIATICA: Primary | ICD-10-CM

## 2023-03-02 DIAGNOSIS — M54.42 CHRONIC LEFT-SIDED LOW BACK PAIN WITH LEFT-SIDED SCIATICA: Primary | ICD-10-CM

## 2023-03-02 PROCEDURE — 99204 OFFICE O/P NEW MOD 45 MIN: CPT | Performed by: NEUROLOGICAL SURGERY

## 2023-03-06 ENCOUNTER — OFFICE VISIT (OUTPATIENT)
Dept: FAMILY MEDICINE CLINIC | Facility: CLINIC | Age: 53
End: 2023-03-06
Payer: COMMERCIAL

## 2023-03-06 VITALS
OXYGEN SATURATION: 95 % | SYSTOLIC BLOOD PRESSURE: 108 MMHG | HEART RATE: 87 BPM | DIASTOLIC BLOOD PRESSURE: 72 MMHG | BODY MASS INDEX: 38.87 KG/M2 | HEIGHT: 72 IN | WEIGHT: 287 LBS

## 2023-03-06 DIAGNOSIS — E66.01 CLASS 2 SEVERE OBESITY DUE TO EXCESS CALORIES WITH SERIOUS COMORBIDITY AND BODY MASS INDEX (BMI) OF 38.0 TO 38.9 IN ADULT: Primary | ICD-10-CM

## 2023-03-06 DIAGNOSIS — E11.9 TYPE 2 DIABETES MELLITUS WITHOUT COMPLICATION, WITHOUT LONG-TERM CURRENT USE OF INSULIN: ICD-10-CM

## 2023-03-06 DIAGNOSIS — Z71.3 DIETARY COUNSELING AND SURVEILLANCE: ICD-10-CM

## 2023-03-06 PROCEDURE — 99214 OFFICE O/P EST MOD 30 MIN: CPT | Performed by: NURSE PRACTITIONER

## 2023-03-06 RX ORDER — PHENTERMINE HYDROCHLORIDE 37.5 MG/1
37.5 TABLET ORAL
Qty: 30 TABLET | Refills: 0 | Status: SHIPPED | OUTPATIENT
Start: 2023-03-06

## 2023-03-06 RX ORDER — TIRZEPATIDE 10 MG/.5ML
10 INJECTION, SOLUTION SUBCUTANEOUS WEEKLY
Qty: 2 ML | Refills: 0 | Status: SHIPPED | OUTPATIENT
Start: 2023-03-06 | End: 2023-03-09 | Stop reason: SDUPTHER

## 2023-03-06 NOTE — PROGRESS NOTES
"Chief Complaint  weight management    Subjective          Booker De Oliveira presents to [facility]  History of Present Illness  Pt is here to follow up on weight management. He has been taking Phentermine and doing well. He has been taking Mounjaro for DM and doing well. He has been watching his diet.       Objective   Vital Signs:   /72   Pulse 87   Ht 182.9 cm (72\")   Wt 130 kg (287 lb)   SpO2 95%   BMI 38.92 kg/m²     Body mass index is 38.92 kg/m².    Review of Systems   Constitutional: Negative for fatigue and fever.   Respiratory: Negative for shortness of breath.    Cardiovascular: Negative for chest pain, palpitations and leg swelling.   Neurological: Negative for syncope.   Psychiatric/Behavioral: The patient is not nervous/anxious.           Current Outpatient Medications:   •  amLODIPine (NORVASC) 10 MG tablet, TAKE 1 TABLET BY MOUTH EVERY DAY, Disp: 90 tablet, Rfl: 0  •  Cyanocobalamin (VITAMIN B 12) 500 MCG tablet, Take 1 tablet by mouth Daily., Disp: , Rfl:   •  diclofenac (VOLTAREN) 75 MG EC tablet, TAKE 1 TABLET BY MOUTH TWICE DAILY, Disp: 60 tablet, Rfl: 0  •  lactulose (CHRONULAC) 10 GM/15ML solution, Take 30 mL by mouth 2 (Two) Times a Day As Needed (Constipation)., Disp: 473 mL, Rfl: 0  •  phentermine (Adipex-P) 37.5 MG tablet, Take 1 tablet by mouth Every Morning Before Breakfast., Disp: 30 tablet, Rfl: 0  •  traZODone (DESYREL) 50 MG tablet, Take 1 tablet by mouth Every Night., Disp: , Rfl:   •  triamcinolone (KENALOG) 0.1 % ointment, Apply 1 application topically to the appropriate area as directed 2 (Two) Times a Day., Disp: 30 g, Rfl: 2  •  valsartan-hydrochlorothiazide (DIOVAN-HCT) 320-25 MG per tablet, TAKE 1 TABLET BY MOUTH EVERY DAY, Disp: 90 tablet, Rfl: 0  •  Tirzepatide (Mounjaro) 10 MG/0.5ML solution pen-injector, Inject 10 mg under the skin into the appropriate area as directed 1 (One) Time Per Week., Disp: 2 mL, Rfl: 0      Allergies: Patient " has no known allergies.    Physical Exam  Constitutional:       Appearance: Normal appearance.   HENT:      Head: Normocephalic.   Eyes:      Conjunctiva/sclera: Conjunctivae normal.      Pupils: Pupils are equal, round, and reactive to light.   Cardiovascular:      Rate and Rhythm: Normal rate and regular rhythm.      Heart sounds: Normal heart sounds.   Pulmonary:      Effort: Pulmonary effort is normal.      Breath sounds: Normal breath sounds.   Abdominal:      Tenderness: There is no abdominal tenderness.   Musculoskeletal:         General: Normal range of motion.   Skin:     General: Skin is warm and dry.      Capillary Refill: Capillary refill takes less than 2 seconds.   Neurological:      General: No focal deficit present.      Mental Status: He is alert and oriented to person, place, and time.   Psychiatric:         Mood and Affect: Mood normal.         Behavior: Behavior normal.         Thought Content: Thought content normal.         Judgment: Judgment normal.          Result Review :                   Assessment and Plan    Diagnoses and all orders for this visit:    1. Class 2 severe obesity due to excess calories with serious comorbidity and body mass index (BMI) of 38.0 to 38.9 in adult ([facility]) (Primary)  Comments:  Continue Phentermine. 1200 desean/day diet and exercise 3-5 days per week.     2. Dietary counseling and surveillance  Comments:  Continue phentermine.  1200/day diet and exercise 3 to 5 days/week.  Controlled subs agreement verbalized.F/U in 1 week.  Orders:  -     phentermine (Adipex-P) 37.5 MG tablet; Take 1 tablet by mouth Every Morning Before Breakfast.  Dispense: 30 tablet; Refill: 0    3. Type 2 diabetes mellitus without complication, without long-term current use of insulin ([facility])  Comments:  Raise Mounjaro to 10mg. We discussed diet and exercise.   Orders:  -     Tirzepatide (Mounjaro) 10 MG/0.5ML solution pen-injector; Inject 10 mg under the skin into the appropriate area as directed  1 (One) Time Per Week.  Dispense: 2 mL; Refill: 0                Follow Up   Return in about 1 month (around 4/6/2023) for if not improving or sooner if symptoms worsen.  Patient was given instructions and counseling regarding his condition or for health maintenance advice. Please see specific information pulled into the AVS if appropriate.     REGINALD Sandoval

## 2023-03-07 ENCOUNTER — PATIENT MESSAGE (OUTPATIENT)
Dept: FAMILY MEDICINE CLINIC | Facility: CLINIC | Age: 53
End: 2023-03-07
Payer: COMMERCIAL

## 2023-03-07 DIAGNOSIS — E11.9 TYPE 2 DIABETES MELLITUS WITHOUT COMPLICATION, WITHOUT LONG-TERM CURRENT USE OF INSULIN: ICD-10-CM

## 2023-03-07 RX ORDER — TIRZEPATIDE 10 MG/.5ML
10 INJECTION, SOLUTION SUBCUTANEOUS WEEKLY
Qty: 2 ML | Refills: 0 | OUTPATIENT
Start: 2023-03-07

## 2023-03-07 NOTE — TELEPHONE ENCOUNTER
Caller: Booker De Oliveira    Relationship: Self    Best call back number: [phone]    Requested Prescriptions:   Requested Prescriptions     Pending Prescriptions Disp Refills   • Tirzepatide (Mounjaro) 10 MG/0.5ML solution pen-injector 2 mL 0     Sig: Inject 10 mg under the skin into the appropriate area as directed 1 (One) Time Per Week.        Pharmacy where request should be sent: [id number] - [place], [place] - [address] AT [address]  & [address] [facility] - [phone]  - [phone] FX     Additional details provided by patient: INSURANCE DENIED. WS TOLD TO CALL JOHNATHAN AS SOON AS HE FOUND OUT. WARM TRANSFERRED PER REQUEST    Does the patient have less than a 3 day supply:  [x] Yes  [] No    Would you like a call back once the refill request has been completed: [] Yes [] No    If the office needs to give you a call back, can they leave a voicemail: [] Yes [] No    Colette Beaver Rep   03/07/23 08:36 EST

## 2023-03-09 ENCOUNTER — TELEPHONE (OUTPATIENT)
Dept: FAMILY MEDICINE CLINIC | Facility: CLINIC | Age: 53
End: 2023-03-09
Payer: COMMERCIAL

## 2023-03-09 RX ORDER — TIRZEPATIDE 10 MG/.5ML
10 INJECTION, SOLUTION SUBCUTANEOUS WEEKLY
Qty: 2 ML | Refills: 0 | Status: SHIPPED | OUTPATIENT
Start: 2023-03-09 | End: 2023-03-09 | Stop reason: SDUPTHER

## 2023-03-09 RX ORDER — TIRZEPATIDE 10 MG/.5ML
10 INJECTION, SOLUTION SUBCUTANEOUS WEEKLY
Qty: 2 ML | Refills: 0 | Status: SHIPPED | OUTPATIENT
Start: 2023-03-09

## 2023-03-09 NOTE — TELEPHONE ENCOUNTER
Caller: Booker De Oliveira    Relationship to patient: Self    Best call back number: [phone]    PATIENT STATES THAT [facility] HAS HIS MONJARO IN STOCK, AND WANTED TO SEE IF THIS COULD BE CALLED IN AS SOON AS POSSIBLE.

## 2023-03-09 NOTE — TELEPHONE ENCOUNTER
I called the pharmacy and they said it is going through patient's insurance no problem. I called patient to let him know and he stated that is not what they told him, but he will call and verify. He will let us know if he can not  his medication

## 2023-03-20 RX ORDER — DICLOFENAC SODIUM 75 MG/1
TABLET, DELAYED RELEASE ORAL
Qty: 60 TABLET | Refills: 0 | Status: SHIPPED | OUTPATIENT
Start: 2023-03-20

## 2023-03-20 RX ORDER — AMLODIPINE BESYLATE 10 MG/1
TABLET ORAL
Qty: 90 TABLET | Refills: 0 | Status: SHIPPED | OUTPATIENT
Start: 2023-03-20

## 2023-03-28 ENCOUNTER — PREP FOR SURGERY (OUTPATIENT)
Dept: SURGERY | Facility: SURGERY CENTER | Age: 53
End: 2023-03-28
Payer: COMMERCIAL

## 2023-03-28 ENCOUNTER — OFFICE VISIT (OUTPATIENT)
Dept: PAIN MEDICINE | Facility: CLINIC | Age: 53
End: 2023-03-28
Payer: COMMERCIAL

## 2023-03-28 VITALS
SYSTOLIC BLOOD PRESSURE: 133 MMHG | RESPIRATION RATE: 18 BRPM | BODY MASS INDEX: 38.49 KG/M2 | OXYGEN SATURATION: 98 % | HEIGHT: 72 IN | WEIGHT: 284.2 LBS | DIASTOLIC BLOOD PRESSURE: 88 MMHG | HEART RATE: 72 BPM | TEMPERATURE: 98 F

## 2023-03-28 DIAGNOSIS — G89.29 CHRONIC LEFT-SIDED LOW BACK PAIN WITH LEFT-SIDED SCIATICA: ICD-10-CM

## 2023-03-28 DIAGNOSIS — M48.061 SPINAL STENOSIS, LUMBAR REGION, WITHOUT NEUROGENIC CLAUDICATION: Primary | ICD-10-CM

## 2023-03-28 DIAGNOSIS — M54.42 CHRONIC LEFT-SIDED LOW BACK PAIN WITH LEFT-SIDED SCIATICA: ICD-10-CM

## 2023-03-28 PROCEDURE — 99214 OFFICE O/P EST MOD 30 MIN: CPT | Performed by: NURSE PRACTITIONER

## 2023-03-28 NOTE — PROGRESS NOTES
CHIEF COMPLAINT  Back pain    Subjective   Booker De Oliveira is a 52 y.o. male.   He presents to the office for evaluation of back pain. He was referred here by Dr. Juan Miguel Charles.     Patient reports back pain started about 3 years ago.  It has become progressively worse over the past few months.  He has had physical therapy without benefit.  Recently evaluated by neurosurgery who is recommending interventional pain management prior to considering more invasive surgical options.    Pain today is rated as a 1/10 VAS.  Location of the pain left lumbosacral are and radiates down the left posterior/lateral leg. The pain is intermittent.  The pain is aggravated by prolonged sitting, elevating legs.  Pain improves with standing, walking.  He has tried oral steroids in the past with some benefit.  He has tried NSAID's without benefit, takes Diclofenac for his knee pain.      He is retired.  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     History of Present Illness     PEG Assessment   What number best describes your pain on average in the past week?1  What number best describes how, during the past week, pain has interfered with your enjoyment of life?0  What number best describes how, during the past week, pain has interfered with your general activity?  0      Current Outpatient Medications:   •  amLODIPine (NORVASC) 10 MG tablet, TAKE 1 TABLET BY MOUTH EVERY DAY, Disp: 90 tablet, Rfl: 0  •  Cyanocobalamin (VITAMIN B 12) 500 MCG tablet, Take 1 tablet by mouth Daily., Disp: , Rfl:   •  diclofenac (VOLTAREN) 75 MG EC tablet, TAKE 1 TABLET BY MOUTH TWICE DAILY, Disp: 60 tablet, Rfl: 0  •  lactulose (CHRONULAC) 10 GM/15ML solution, Take 30 mL by mouth 2 (Two) Times a Day As Needed (Constipation)., Disp: 473 mL, Rfl: 0  •  phentermine (Adipex-P) 37.5 MG tablet, Take 1 tablet by mouth Every Morning Before Breakfast., Disp: 30 tablet, Rfl: 0  •  Tirzepatide (Mounjaro) 10 MG/0.5ML solution  "pen-injector, Inject 0.5 mL under the skin into the appropriate area as directed 1 (One) Time Per Week., Disp: 2 mL, Rfl: 0  •  traZODone (DESYREL) 50 MG tablet, Take 1 tablet by mouth Every Night., Disp: , Rfl:   •  triamcinolone (KENALOG) 0.1 % ointment, Apply 1 application topically to the appropriate area as directed 2 (Two) Times a Day., Disp: 30 g, Rfl: 2  •  valsartan-hydrochlorothiazide (DIOVAN-HCT) 320-25 MG per tablet, TAKE 1 TABLET BY MOUTH EVERY DAY, Disp: 90 tablet, Rfl: 0    The following portions of the patient's history were reviewed and updated as appropriate: allergies, current medications, past family history, past medical history, past social history, past surgical history and problem list.      REVIEW OF PERTINENT MEDICAL DATA        Patient was evaluated by Dr. Juan Miguel Charles 3/2/2023.  I reviewed this note.  He was seen for back pain that radiates down the left leg.  Recommending interventional pain management.  Would like to try to get every effort to avoid surgery.    Review of Systems   Gastrointestinal: Positive for constipation. Negative for diarrhea.   Genitourinary: Negative for difficulty urinating.   Neurological: Negative for weakness and numbness.   Psychiatric/Behavioral: Positive for sleep disturbance. Negative for suicidal ideas. The patient is nervous/anxious.      Vitals:    03/28/23 0854   BP: 133/88   Pulse: 72   Resp: 18   Temp: 98 °F (36.7 °C)   SpO2: 98%   Weight: 129 kg (284 lb 3.2 oz)   Height: 182.9 cm (72\")   PainSc:   1   PainLoc: Leg         Objective   Physical Exam  Vitals and nursing note reviewed.   Constitutional:       General: He is not in acute distress.     Appearance: Normal appearance. He is not ill-appearing.   Pulmonary:      Effort: Pulmonary effort is normal. No respiratory distress.   Musculoskeletal:      Lumbar back: Tenderness and bony tenderness present. Positive left straight leg raise test.   Neurological:      Mental Status: He is alert and " oriented to person, place, and time.      Motor: Motor function is intact. No weakness.      Gait: Gait normal.   Psychiatric:         Mood and Affect: Mood normal.         Behavior: Behavior normal.         Assessment & Plan   Diagnoses and all orders for this visit:    1. Spinal stenosis, lumbar region, without neurogenic claudication (Primary)    2. Chronic left-sided low back pain with left-sided sciatica        --- Left L4 and S1 LTFESI       ---  Indications for epidural injection:  Plan is to proceed with epidural at the appropriate level.  If the patient receives significant pain reduction and improvement in function and the plan will be to repeat the epidural when the pain worsens.  If a second epidural provides at least 6 weeks of sustained improvement that includes both pain reduction and improvement in function then an epidural injection could be repeated once again at the same level.  This is a mutual decision between the clinician and the patient that includes discussions including risks and benefits in detail as well as alternative therapies.  Patient's questions were answered to their satisfaction and to their understanding.  ---        --- Follow-up for procedure          As the clinician, I personally reviewed the FERMÍN from 3/28/2023 while the patient was in the office today.     Dictated utilizing Dragon dictation.

## 2023-03-30 ENCOUNTER — TRANSCRIBE ORDERS (OUTPATIENT)
Dept: SURGERY | Facility: SURGERY CENTER | Age: 53
End: 2023-03-30
Payer: COMMERCIAL

## 2023-03-30 DIAGNOSIS — Z41.9 SURGERY, ELECTIVE: Primary | ICD-10-CM

## 2023-04-04 RX ORDER — TRAZODONE HYDROCHLORIDE 50 MG/1
TABLET ORAL
Qty: 180 TABLET | Refills: 0 | Status: SHIPPED | OUTPATIENT
Start: 2023-04-04

## 2023-04-10 ENCOUNTER — OFFICE VISIT (OUTPATIENT)
Dept: FAMILY MEDICINE CLINIC | Facility: CLINIC | Age: 53
End: 2023-04-10
Payer: COMMERCIAL

## 2023-04-10 VITALS
HEIGHT: 72 IN | BODY MASS INDEX: 37.52 KG/M2 | DIASTOLIC BLOOD PRESSURE: 80 MMHG | SYSTOLIC BLOOD PRESSURE: 110 MMHG | OXYGEN SATURATION: 97 % | WEIGHT: 277 LBS | HEART RATE: 83 BPM

## 2023-04-10 DIAGNOSIS — I10 PRIMARY HYPERTENSION: ICD-10-CM

## 2023-04-10 DIAGNOSIS — E78.2 MIXED HYPERLIPIDEMIA: Primary | ICD-10-CM

## 2023-04-10 DIAGNOSIS — E11.9 TYPE 2 DIABETES MELLITUS WITHOUT COMPLICATION, WITHOUT LONG-TERM CURRENT USE OF INSULIN: ICD-10-CM

## 2023-04-10 DIAGNOSIS — E56.9 VITAMIN DEFICIENCY: ICD-10-CM

## 2023-04-10 DIAGNOSIS — Z71.3 DIETARY COUNSELING AND SURVEILLANCE: ICD-10-CM

## 2023-04-10 DIAGNOSIS — J06.9 ACUTE URI: ICD-10-CM

## 2023-04-10 RX ORDER — PHENTERMINE HYDROCHLORIDE 37.5 MG/1
37.5 TABLET ORAL
Qty: 30 TABLET | Refills: 0 | Status: SHIPPED | OUTPATIENT
Start: 2023-04-10

## 2023-04-10 RX ORDER — VALSARTAN AND HYDROCHLOROTHIAZIDE 160; 12.5 MG/1; MG/1
1 TABLET, FILM COATED ORAL DAILY
Qty: 90 TABLET | Refills: 1 | Status: SHIPPED | OUTPATIENT
Start: 2023-04-10

## 2023-04-10 RX ORDER — TIRZEPATIDE 10 MG/.5ML
10 INJECTION, SOLUTION SUBCUTANEOUS WEEKLY
Qty: 6 ML | Refills: 1 | Status: SHIPPED | OUTPATIENT
Start: 2023-04-10

## 2023-04-10 NOTE — PROGRESS NOTES
"Chief Complaint  weight management    Subjective          Booker De Oliveira presents to [facility]  History of Present Illness  Pt is here to follow up on DM and weight management. He is doing well on Mounjaro and Phentermine. He denies side effects. He has had cough and congestion since Saturday. He denies fever, chills, or myalgias.       Objective   Vital Signs:   /80   Pulse 83   Ht 182.9 cm (72\")   Wt 126 kg (277 lb)   SpO2 97%   BMI 37.57 kg/m²     Body mass index is 37.57 kg/m².    Review of Systems   Constitutional: Negative for fatigue and fever.   HENT: Positive for congestion.    Respiratory: Positive for cough. Negative for shortness of breath.    Cardiovascular: Negative for chest pain, palpitations and leg swelling.   Neurological: Negative for syncope.   Psychiatric/Behavioral: The patient is not nervous/anxious.           Current Outpatient Medications:   •  amLODIPine (NORVASC) 10 MG tablet, TAKE 1 TABLET BY MOUTH EVERY DAY, Disp: 90 tablet, Rfl: 0  •  Cyanocobalamin (VITAMIN B 12) 500 MCG tablet, Take 1 tablet by mouth Daily., Disp: , Rfl:   •  diclofenac (VOLTAREN) 75 MG EC tablet, TAKE 1 TABLET BY MOUTH TWICE DAILY, Disp: 60 tablet, Rfl: 0  •  lactulose (CHRONULAC) 10 GM/15ML solution, Take 30 mL by mouth 2 (Two) Times a Day As Needed (Constipation)., Disp: 473 mL, Rfl: 0  •  phentermine (Adipex-P) 37.5 MG tablet, Take 1 tablet by mouth Every Morning Before Breakfast., Disp: 30 tablet, Rfl: 0  •  Tirzepatide (Mounjaro) 10 MG/0.5ML solution pen-injector, Inject 0.5 mL under the skin into the appropriate area as directed 1 (One) Time Per Week., Disp: 6 mL, Rfl: 1  •  traZODone (DESYREL) 50 MG tablet, TAKE 1 TO 2 TABLETS BY MOUTH EVERY NIGHT AT BEDTIME, Disp: 180 tablet, Rfl: 0  •  triamcinolone (KENALOG) 0.1 % ointment, Apply 1 application topically to the appropriate area as directed 2 (Two) Times a Day., Disp: 30 g, Rfl: 2  •  valsartan-hydrochlorothiazide " (Diovan HCT) 160-12.5 MG per tablet, Take 1 tablet by mouth Daily., Disp: 90 tablet, Rfl: 1      Allergies: Patient has no known allergies.    Physical Exam  Constitutional:       Appearance: Normal appearance.   HENT:      Head: Normocephalic.   Eyes:      Conjunctiva/sclera: Conjunctivae normal.      Pupils: Pupils are equal, round, and reactive to light.   Cardiovascular:      Rate and Rhythm: Normal rate and regular rhythm.      Heart sounds: Normal heart sounds.   Pulmonary:      Effort: Pulmonary effort is normal.      Breath sounds: Normal breath sounds.   Abdominal:      Tenderness: There is no abdominal tenderness.   Musculoskeletal:         General: Normal range of motion.   Skin:     General: Skin is warm and dry.      Capillary Refill: Capillary refill takes less than 2 seconds.   Neurological:      General: No focal deficit present.      Mental Status: He is alert and oriented to person, place, and time.   Psychiatric:         Mood and Affect: Mood normal.         Behavior: Behavior normal.         Thought Content: Thought content normal.         Judgment: Judgment normal.          Result Review :                   Assessment and Plan    Diagnoses and all orders for this visit:    1. Mixed hyperlipidemia (Primary)  -     CBC & Differential; Future  -     Lipid Panel; Future  -     CBC & Differential  -     Lipid Panel    2. Type 2 diabetes mellitus without complication, without long-term current use of insulin  Comments:  Continue Mounjaro 10mg. We discussed diet and exercise.   Orders:  -     Tirzepatide (Mounjaro) 10 MG/0.5ML solution pen-injector; Inject 0.5 mL under the skin into the appropriate area as directed 1 (One) Time Per Week.  Dispense: 6 mL; Refill: 1  -     Comprehensive Metabolic Panel; Future  -     Hemoglobin A1c; Future  -     Comprehensive Metabolic Panel  -     Hemoglobin A1c    3. Dietary counseling and surveillance  Comments:  Continue phentermine.  1200/day diet and exercise 3 to  5 days/week.  Controlled subs agreement verbalized.F/U in 1 week.  Orders:  -     phentermine (Adipex-P) 37.5 MG tablet; Take 1 tablet by mouth Every Morning Before Breakfast.  Dispense: 30 tablet; Refill: 0    4. Vitamin deficiency  -     Vitamin B12; Future  -     Vitamin D,25-Hydroxy; Future  -     Folate; Future  -     Vitamin B12  -     Vitamin D,25-Hydroxy  -     Folate    5. Acute URI  Comments:  Increase fluids. Mucinex and Zyrtec as needed. RTC for worsened sx and if not improving in 1 week.     6. Primary hypertension  Comments:  Lower DiovanHCT to 160/12.5mg. Monitor BP.  Orders:  -     valsartan-hydrochlorothiazide (Diovan HCT) 160-12.5 MG per tablet; Take 1 tablet by mouth Daily.  Dispense: 90 tablet; Refill: 1                Follow Up   Return in about 1 week (around 4/17/2023) for if not improving or sooner if symptoms worsen.  Patient was given instructions and counseling regarding his condition or for health maintenance advice. Please see specific information pulled into the AVS if appropriate.     REGINALD Sandoval

## 2023-04-11 ENCOUNTER — PATIENT MESSAGE (OUTPATIENT)
Dept: FAMILY MEDICINE CLINIC | Facility: CLINIC | Age: 53
End: 2023-04-11
Payer: COMMERCIAL

## 2023-04-11 LAB
25(OH)D3+25(OH)D2 SERPL-MCNC: 41.3 NG/ML (ref 30–100)
ALBUMIN SERPL-MCNC: 4.1 G/DL (ref 3.8–4.9)
ALBUMIN/GLOB SERPL: 1.5 {RATIO} (ref 1.2–2.2)
ALP SERPL-CCNC: 82 IU/L (ref 44–121)
ALT SERPL-CCNC: 31 IU/L (ref 0–44)
AST SERPL-CCNC: 25 IU/L (ref 0–40)
BASOPHILS # BLD AUTO: 0.1 X10E3/UL (ref 0–0.2)
BASOPHILS NFR BLD AUTO: 1 %
BILIRUB SERPL-MCNC: 0.5 MG/DL (ref 0–1.2)
BUN SERPL-MCNC: 15 MG/DL (ref 6–24)
BUN/CREAT SERPL: 13 (ref 9–20)
CALCIUM SERPL-MCNC: 8.9 MG/DL (ref 8.7–10.2)
CHLORIDE SERPL-SCNC: 104 MMOL/L (ref 96–106)
CHOLEST SERPL-MCNC: 114 MG/DL (ref 100–199)
CO2 SERPL-SCNC: 25 MMOL/L (ref 20–29)
CREAT SERPL-MCNC: 1.19 MG/DL (ref 0.76–1.27)
EGFRCR SERPLBLD CKD-EPI 2021: 73 ML/MIN/1.73
EOSINOPHIL # BLD AUTO: 0.4 X10E3/UL (ref 0–0.4)
EOSINOPHIL NFR BLD AUTO: 5 %
ERYTHROCYTE [DISTWIDTH] IN BLOOD BY AUTOMATED COUNT: 13 % (ref 11.6–15.4)
FOLATE SERPL-MCNC: 3.3 NG/ML
GLOBULIN SER CALC-MCNC: 2.8 G/DL (ref 1.5–4.5)
GLUCOSE SERPL-MCNC: 90 MG/DL (ref 70–99)
HBA1C MFR BLD: 5.4 % (ref 4.8–5.6)
HCT VFR BLD AUTO: 43.3 % (ref 37.5–51)
HDLC SERPL-MCNC: 34 MG/DL
HGB BLD-MCNC: 15.2 G/DL (ref 13–17.7)
IMM GRANULOCYTES # BLD AUTO: 0 X10E3/UL (ref 0–0.1)
IMM GRANULOCYTES NFR BLD AUTO: 0 %
LDLC SERPL CALC-MCNC: 67 MG/DL (ref 0–99)
LYMPHOCYTES # BLD AUTO: 1.4 X10E3/UL (ref 0.7–3.1)
LYMPHOCYTES NFR BLD AUTO: 17 %
MCH RBC QN AUTO: 31 PG (ref 26.6–33)
MCHC RBC AUTO-ENTMCNC: 35.1 G/DL (ref 31.5–35.7)
MCV RBC AUTO: 88 FL (ref 79–97)
MONOCYTES # BLD AUTO: 1.4 X10E3/UL (ref 0.1–0.9)
MONOCYTES NFR BLD AUTO: 16 %
NEUTROPHILS # BLD AUTO: 5.3 X10E3/UL (ref 1.4–7)
NEUTROPHILS NFR BLD AUTO: 61 %
PLATELET # BLD AUTO: 258 X10E3/UL (ref 150–450)
POTASSIUM SERPL-SCNC: 3.8 MMOL/L (ref 3.5–5.2)
PROT SERPL-MCNC: 6.9 G/DL (ref 6–8.5)
RBC # BLD AUTO: 4.9 X10E6/UL (ref 4.14–5.8)
SODIUM SERPL-SCNC: 142 MMOL/L (ref 134–144)
TRIGL SERPL-MCNC: 56 MG/DL (ref 0–149)
VIT B12 SERPL-MCNC: 1998 PG/ML (ref 232–1245)
VLDLC SERPL CALC-MCNC: 13 MG/DL (ref 5–40)
WBC # BLD AUTO: 8.5 X10E3/UL (ref 3.4–10.8)

## 2023-04-13 NOTE — PROGRESS NOTES
Your B12 level was elevated.  Stop taking a B12 supplement if you are taking one.  Your HDL cholesterol, which is your good cholesterol, was slightly low and exercise can help improve this.  Your A1c has improved so keep up the great work!  Everything else looked good.  Take care!

## 2023-04-18 RX ORDER — DICLOFENAC SODIUM 75 MG/1
TABLET, DELAYED RELEASE ORAL
Qty: 60 TABLET | Refills: 0 | Status: SHIPPED | OUTPATIENT
Start: 2023-04-18

## 2023-04-19 RX ORDER — AMOXICILLIN 875 MG/1
875 TABLET, COATED ORAL 2 TIMES DAILY
Qty: 20 TABLET | Refills: 0 | Status: SHIPPED | OUTPATIENT
Start: 2023-04-19

## 2023-05-02 NOTE — TELEPHONE ENCOUNTER
HUB TO READ    Left message for patient.    He is scheduled on December 22 at 2pm for open MRI.  [facility]  [address] [place] [address]  #[phone]   ID FOLLOW UP NOTE    Patient: Kirill Ann Date of Service: 2023   : 1975 MRN: [id number]     Chief Complaint   Patient presents with   • Office Visit     Follow up       Today he is accompanied by alone.     HISTORY OF PRESENT ILLNESS:  Kirill Ann is a 48 year old male who presents today for follow up of his HIV disease.  Feels fairly well.  No fevers, chills or sweats.  No cough or Sob.  No CP.  No nausea, vomiting, diarrhea, constipation, or abdominal pain.  No urinary complaints, was treated empirically last Dec and subsequently saw Urology with normal repeat PSA level.  No urinary complaints currently.  No rashes, headaches (except occas stress headaches) or vision changes.  .No paresthesias.   No depression.  Energy and appetite are okay.  Has pain in his calves when he runs or vigorously walks.  Nonsmoker (smoked a couple years in early 20s).  No family hx of heart disease.        Review of Systems   All other systems reviewed and are negative.      PAST MEDICAL HISTORY:  Past Medical History:   Diagnosis Date   • Essential (primary) hypertension    • HIV disease (CMD)        PAST SURGICAL HISTORY:  Past Surgical History:   Procedure Laterality Date   • Appendectomy         FAMILY HISTORY:  Family History   Problem Relation Age of Onset   • Rheumatoid Arthritis Mother    • Hypertension Father    • Diabetes Father        SOCIAL HISTORY:  Social History     Tobacco Use   • Smoking status: Never   • Smokeless tobacco: Never   Vaping Use   • Vaping status: never used   Substance Use Topics   • Alcohol use: Yes     Comment: social   • Drug use: Never       PROBLEM LIST  Patient Active Problem List   Diagnosis   • HIV (human immunodeficiency virus infection) (CMD)   • Dysuria   • Increased urinary frequency   • Flu vaccine need   • Health maintenance examination   • Low testosterone       MEDICATIONS:  Current Outpatient Medications   Medication Sig   • Biktarvy -25 MG per tablet Take 1 tablet by  mouth daily.   • GONADORELIN HCL IJ    • Testosterone Cypionate 200 MG/ML Kit      No current facility-administered medications for this visit.       ALLERGIES:  ALLERGIES:  No Known Allergies      Visit Vitals  /88 (BP Location: LUE - Left upper extremity)   Pulse 67   Temp 97.4 °F (36.3 °C) (Temporal)   Resp 18   Ht 6' 5\" (1.956 m)   Wt 113.4 kg (250 lb)   SpO2 99%   BMI 29.65 kg/m²         Physical Exam  WD Tall WN Man in NAD  HEENT     EOM  Full   PERRL   Anicteric  No petechiae  No conjunctivitis  No oral lesions    Neck          No signif lymphadenopathy  Lungs        Clear to ausc  Cor             RR  Nl s1s2  No murmur   Axillae        No signif lymphadenopathy  Abd            Soft  No masses, hepatomegaly or tenderness  No signif inguinal nodes  Ext               No cyanosis, clubbing or edema  Nl muscle mass  Skin            No rashes or other lesions        Most Recent Immunizations   Administered Date(s) Administered   • COVID Pfizer 12Y+ 08/31/2021   • Hep B, adult 11/14/2011   • Influenza, Unspecified Formulation 12/09/2013   • Influenza, high dose seasonal, preservative-free 12/09/2013   • Influenza, quadrivalent, preserve-free 12/13/2022   • Pneumococcal Conjugate 13 Valent Vacc (Prevnar 13) 12/09/2013   • Pneumococcal Polysaccharide Vacc (Pneumovax 23) 05/02/2023   • Pneumococcal, Unspecified Formulation 05/17/2007   • Tdap 12/13/2022   • Vaccinia, Smallpox, Monkeypox ID 0.1 mL Vaccine Live, PF 09/14/2022       LAB RESULTS:    Lab Services on 05/02/2023   Component Date Value   • Sodium 05/02/2023 138    • Potassium 05/02/2023 4.7    • Chloride 05/02/2023 104    • Carbon Dioxide 05/02/2023 24    • Anion Gap 05/02/2023 15    • Glucose 05/02/2023 102 (H)    • BUN 05/02/2023 18    • Creatinine 05/02/2023 1.16    • Glomerular Filtration Ra* 05/02/2023 77    • BUN/Cr 05/02/2023 16    • Calcium 05/02/2023 9.1    • Bilirubin, Total 05/02/2023 1.1 (H)    • GOT/AST 05/02/2023 17    • GPT/ALT 05/02/2023  34    • Alkaline Phosphatase 05/02/2023 79    • Albumin 05/02/2023 3.8    • Protein, Total 05/02/2023 7.2    • Globulin 05/02/2023 3.4    • A/G Ratio 05/02/2023 1.1    • Absolute CD3/CD4+ (T-hel* 05/02/2023 720    • Percent CD3/CD4+ 05/02/2023 38    • Result Comment 05/02/2023     • HIV-1, Viral Load 05/02/2023 Not Detected    • HIV-1, Log 05/02/2023 Not Detected    • Procedural Notes 05/02/2023                      Value:This result contains rich text formatting which cannot be displayed here.   • TSH 05/02/2023 1.520    • Hepatitis B Surface Anti* 05/02/2023 Positive    • WBC 05/02/2023 6.3    • RBC 05/02/2023 6.04 (H)    • HGB 05/02/2023 19.4 (H)    • HCT 05/02/2023 57.7 (H)    • MCV 05/02/2023 95.5    • MCH 05/02/2023 32.1    • MCHC 05/02/2023 33.6    • RDW-CV 05/02/2023 13.3    • RDW-SD 05/02/2023 46.7    • PLT 05/02/2023 204    • NRBC 05/02/2023 0    • Neutrophil, Percent 05/02/2023 61    • Lymphocytes, Percent 05/02/2023 29    • Mono, Percent 05/02/2023 7    • Eosinophils, Percent 05/02/2023 2    • Basophils, Percent 05/02/2023 1    • Immature Granulocytes 05/02/2023 0    • Absolute Neutrophils 05/02/2023 3.8    • Absolute Lymphocytes 05/02/2023 1.8    • Absolute Monocytes 05/02/2023 0.5    • Absolute Eosinophils  05/02/2023 0.1    • Absolute Basophils 05/02/2023 0.1    • Absolute Immature Granul* 05/02/2023 0.0    • Chlamydia trachomatis by* 05/02/2023 Negative    • Neisseria gonorrhoeae by* 05/02/2023 Negative    • RESULT COMMENT 05/02/2023                      Value:This result contains rich text formatting which cannot be displayed here.   • Disclaimer 05/02/2023                      Value:This result contains rich text formatting which cannot be displayed here.   Office Visit on 05/02/2023   Component Date Value   • Chlamydia trachomatis by* 05/02/2023 Negative    • Neisseria gonorrhoeae by* 05/02/2023 Negative    • RESULT COMMENT 05/02/2023                      Value:This result contains rich text  formatting which cannot be displayed here.   • Disclaimer 05/02/2023                      Value:This result contains rich text formatting which cannot be displayed here.   • Chlamydia trachomatis by* 05/02/2023 Negative    • Neisseria gonorrhoeae by* 05/02/2023 Negative    • RESULT COMMENT 05/02/2023                      Value:This result contains rich text formatting which cannot be displayed here.   • Disclaimer 05/02/2023                      Value:This result contains rich text formatting which cannot be displayed here.   Lab Services on 12/13/2022   Component Date Value   • Hemoglobin A1C 12/13/2022 5.2    • Fasting Status 12/13/2022 8    • Cholesterol 12/13/2022 108    • Triglycerides 12/13/2022 86    • HDL 12/13/2022 42    • LDL 12/13/2022 49    • Non-HDL Cholesterol 12/13/2022 66    • Cholesterol/ HDL Ratio 12/13/2022 2.6    • Hepatitis A Antibody, IgM 12/13/2022 Negative    • Hepatitis A Interpretati* 12/13/2022     • Hepatitis B Surface Anti* 12/13/2022 Negative    • Hepatitis B Core Antibod* 12/13/2022 Negative    • Hepatitis B Interpretati* 12/13/2022     • Hepatitis C Antibody 12/13/2022 Negative    • HIV Antigen/ Antibody Co* 12/13/2022 Reactive (A)    • RPR 12/13/2022 Nonreactive    • Prostate Specific Antigen 12/13/2022 25.20 (H)    • Absolute CD3+ (Total T c* 12/13/2022 1,327    • Absolute CD3/CD4+ (T-hel* 12/13/2022 677    • Absolute CD3/CD8+ (T-sup* 12/13/2022 662    • [place]/Supressor Ratio 12/13/2022 1.0    • Percent CD3+ 12/13/2022 73    • Percent CD3/CD4+ 12/13/2022 37    • Percent CD3/CD8+ 12/13/2022 36    • Result Comment 12/13/2022     • HIV-1, Viral Load 12/13/2022 <30 (H)    • HIV-1 Log 12/13/2022 <1.47 (H)    • Procedural Notes 12/13/2022                      Value:This result contains rich text formatting which cannot be displayed here.   • COLOR, URINALYSIS 12/13/2022 Yellow    • APPEARANCE, URINALYSIS 12/13/2022 Clear    • GLUCOSE, URINALYSIS 12/13/2022 Negative    • BILIRUBIN,  URINALYSIS 12/13/2022 Negative    • KETONES, URINALYSIS 12/13/2022 Trace (A)    • SPECIFIC GRAVITY, URINAL* 12/13/2022 1.028    • OCCULT BLOOD, URINALYSIS 12/13/2022 Negative    • PH, URINALYSIS 12/13/2022 6.0    • PROTEIN, URINALYSIS 12/13/2022 100 (A)    • UROBILINOGEN, URINALYSIS 12/13/2022 0.2    • NITRITE, URINALYSIS 12/13/2022 Negative    • LEUKOCYTE ESTERASE, URIN* 12/13/2022 Negative    • SQUAMOUS EPITHELIAL, URI* 12/13/2022 None Seen    • ERYTHROCYTES, URINALYSIS 12/13/2022 1 to 2    • LEUKOCYTES, URINALYSIS 12/13/2022 1 to 5    • BACTERIA, URINALYSIS 12/13/2022 None Seen    • HYALINE CASTS, URINALYSIS 12/13/2022 None Seen    • MUCUS 12/13/2022 Present    • Microalbumin, Urine 12/13/2022 10.40    • Creatinine, Urine 12/13/2022 299.00    • Microalbumin/ Creatinine* 12/13/2022 34.8 (H)    • Urine, Bacterial Culture 12/13/2022 No Growth.    • Chlamydia trachomatis by* 12/13/2022 Negative    • Neisseria gonorrhoeae by* 12/13/2022 Negative    • Disclaimer 12/13/2022                      Value:This result contains rich text formatting which cannot be displayed here.   • HIV 1/ HIV 2 Antibody Di* 12/13/2022 HIV-1 Positive    Office Visit on 12/13/2022   Component Date Value   • Chlamydia trachomatis by* 12/13/2022 Negative    • Neisseria gonorrhoeae by* 12/13/2022 Negative    • Disclaimer 12/13/2022                      Value:This result contains rich text formatting which cannot be displayed here.   • Chlamydia trachomatis by* 12/13/2022 Negative    • Neisseria gonorrhoeae by* 12/13/2022 Negative    • Disclaimer 12/13/2022                      Value:This result contains rich text formatting which cannot be displayed here.       RPR (no units)   Date Value   12/13/2022 Nonreactive     HIV Antigen/ Antibody Combo Screen (no units)   Date Value   12/13/2022 Reactive (A)       Assessment AND PLAN:  This is a 48 year old year-old male who presents with   1. HIV disease (CMS/HCC)  On Biktarvy and appears to have good  adherence.  Tolerating the regimen well without GI or other significant side effects.   Has had good virologic suppression on this.  Previous results and goals discussed.  Will reassess today.  Should CPM, stay well hydrated.  Should get regular exercise, good nutrition. Needs further vaccines discussed.     2. Screen for STD (sexually transmitted disease)  Done.     3. Low testosterone  On testosterone and anastrozole though outside clinic.  No notes available from them.  Had high PSA when checked last December and was seen by Urology.  However, repeat PSA was normal.  May have been elevated due to some type of transient prostate inflammation.  Need outside records, he will obtain.     4. Leg pain  With any activity.  Unclear etiology, needs evaluation.  Nonsmoker. Should see Dr Kay.    Orders Placed This Encounter   • PNEUMOCOCCAL POLYSACCHARIDE ADULT VACC, IM/SQ (PNEUMOVAX 23)   • Comprehensive Metabolic Panel   • CBC with Automated Differential   • CD4 Count   • HIV RNA Quantitative   • Chlamydia/Gonorrhea by Nucleic Acid Amplification   • Chlamydia/Gonorrhea by Nucleic Acid Amplification   • Chlamydia/Gonorrhea by Nucleic Acid Amplification   • Thyroid Stimulating Hormone Reflex   • Hepatitis B Surface Antibody   • Biktarvy -25 MG per tablet     Recommend:   Refer to Orders  Multivitamin with minerals  Good nutrition and exercise  Patient education completed on disease process, etiology, and prognosis  Return to the clinic as clinically indicated as discussed with patient who verbalized understanding of and agreement with the plan.       Proper usage and side effects of medications reviewed & discussed. .      Instructions provided as documented in the AVS.    Return in about 4 months (around 9/2/2023).      The patient indicated understanding of the diagnosis and agreed with the plan of care.    Rosa Keller MD

## 2023-05-08 ENCOUNTER — TELEPHONE (OUTPATIENT)
Dept: PAIN MEDICINE | Facility: CLINIC | Age: 53
End: 2023-05-08

## 2023-05-08 NOTE — TELEPHONE ENCOUNTER
Provider: DR. ROMAN  Caller: KIERSTEN REIS  Relationship to Patient: SELF  Phone Number: [phone]  Reason for Call: PATIENT CALLED WANTING TO CANCEL 05/11/23 left L4 and S1 lumbar transforaminal epidural steroid injection. STATES HE DOES NOT WANT TO RESCHEDULE AT THIS TIME. PLEASE ADVISE.

## 2023-05-18 RX ORDER — VALSARTAN AND HYDROCHLOROTHIAZIDE 320; 25 MG/1; MG/1
TABLET, FILM COATED ORAL
Qty: 90 TABLET | Refills: 0 | OUTPATIENT
Start: 2023-05-18

## 2023-05-18 RX ORDER — DICLOFENAC SODIUM 75 MG/1
TABLET, DELAYED RELEASE ORAL
Qty: 60 TABLET | Refills: 0 | Status: SHIPPED | OUTPATIENT
Start: 2023-05-18

## 2023-05-27 ENCOUNTER — PATIENT MESSAGE (OUTPATIENT)
Dept: FAMILY MEDICINE CLINIC | Facility: CLINIC | Age: 53
End: 2023-05-27

## 2023-06-05 ENCOUNTER — OFFICE VISIT (OUTPATIENT)
Dept: FAMILY MEDICINE CLINIC | Facility: CLINIC | Age: 53
End: 2023-06-05
Payer: COMMERCIAL

## 2023-06-05 VITALS
HEART RATE: 57 BPM | BODY MASS INDEX: 37.93 KG/M2 | SYSTOLIC BLOOD PRESSURE: 110 MMHG | HEIGHT: 72 IN | WEIGHT: 280 LBS | DIASTOLIC BLOOD PRESSURE: 70 MMHG | OXYGEN SATURATION: 97 %

## 2023-06-05 DIAGNOSIS — M79.10 MYALGIA: Primary | ICD-10-CM

## 2023-06-05 DIAGNOSIS — M25.561 ACUTE PAIN OF RIGHT KNEE: ICD-10-CM

## 2023-06-05 PROCEDURE — 99213 OFFICE O/P EST LOW 20 MIN: CPT | Performed by: PHYSICIAN ASSISTANT

## 2023-06-05 NOTE — PROGRESS NOTES
"Chief Complaint  knee pain (Right knee pain )    Subjective          Booker De Oliveira presents to [facility]  History of Present Illness  Patient in today to discuss flare up of right knee pain. He was interested in steroid shot- states has had in past and helped. He is planning on leaving for vacation and was trying to have done prior to travel. Denies any new injury. Denies buckling of knee. Also states a few weeks ago, was having bad muscle cramps to both legs. Denies any new meds- admits does not drink a lot of water. States now occurs intermittently whereas before was multiple times/day. Denies swelling or redness to lower legs.     Objective   Vital Signs:   /70 (BP Location: Left arm, Patient Position: Sitting, Cuff Size: Large Adult)   Pulse 57   Ht 182.9 cm (72\")   Wt 127 kg (280 lb)   SpO2 97%   BMI 37.97 kg/m²     Body mass index is 37.97 kg/m².    Review of Systems   Constitutional:  Negative for fever.   Respiratory:  Negative for cough and shortness of breath.    Gastrointestinal:  Negative for abdominal pain, diarrhea, nausea and vomiting.   Musculoskeletal:  Positive for arthralgias and myalgias. Negative for joint swelling.     Past History:  Medical History: has a past medical history of Benign essential HTN, Hepatitis B, Hyperlipidemia, Hypertension, Low back pain, Sleep apnea, and Type 2 diabetes mellitus.   Surgical History: has a past surgical history that includes Shoulder arthroscopy (Left); Cholecystectomy (2013); Appendectomy; and Hernia repair.   Family History: family history includes Cancer in his father; Coronary artery disease in his mother; Heart disease in his mother; Hypertension in his father; Liver cancer in his father.   Social History: reports that he has never smoked. He has never used smokeless tobacco. He reports that he does not drink alcohol and does not use drugs.      Current Outpatient Medications:     diclofenac (VOLTAREN) 75 MG EC " tablet, TAKE 1 TABLET BY MOUTH TWICE DAILY, Disp: 60 tablet, Rfl: 0    Tirzepatide (Mounjaro) 10 MG/0.5ML solution pen-injector, Inject 0.5 mL under the skin into the appropriate area as directed 1 (One) Time Per Week., Disp: 6 mL, Rfl: 1    traZODone (DESYREL) 50 MG tablet, TAKE 1 TO 2 TABLETS BY MOUTH EVERY NIGHT AT BEDTIME, Disp: 180 tablet, Rfl: 0    triamcinolone (KENALOG) 0.1 % ointment, Apply 1 application topically to the appropriate area as directed 2 (Two) Times a Day., Disp: 30 g, Rfl: 2    valsartan-hydrochlorothiazide (Diovan HCT) 160-12.5 MG per tablet, Take 1 tablet by mouth Daily., Disp: 90 tablet, Rfl: 1  Allergies: Patient has no known allergies.    Physical Exam  Constitutional:       Appearance: Normal appearance.   Cardiovascular:      Heart sounds: Normal heart sounds.   Pulmonary:      Effort: Pulmonary effort is normal.      Breath sounds: Normal breath sounds.   Musculoskeletal:      Comments: FROM of right knee; no swelling/redness noted; mild tenderness noted with ROM and tender to palpate anterior knee; walks with nml gait; no swelling or redness noted to bilateral lower extremities   Neurological:      Mental Status: He is alert.           Assessment and Plan   Diagnoses and all orders for this visit:    1. Myalgia (Primary)  -     Cancel: Comprehensive Metabolic Panel  -     Cancel: Magnesium  -     Cancel: CK  -     CK  -     Comprehensive Metabolic Panel  -     Magnesium  Will check a few labs today- encouraged good hydration; rtc or to ER for any worsening symptoms if needed.   2. Acute pain of right knee  May continue anti-inflammatory, ice and elevation. He was interested in injection to knee- discussed would recommend to f/up with ortho to discuss injection therapy. RTC prn.           Follow Up   No follow-ups on file.  Patient was given instructions and counseling regarding his condition or for health maintenance advice. Please see specific information pulled into the AVS if  appropriate.     Tereza Palacio PA-C

## 2023-06-06 LAB
ALBUMIN SERPL-MCNC: 4.2 G/DL (ref 3.8–4.9)
ALBUMIN/GLOB SERPL: 1.8 {RATIO} (ref 1.2–2.2)
ALP SERPL-CCNC: 68 IU/L (ref 44–121)
ALT SERPL-CCNC: 28 IU/L (ref 0–44)
AST SERPL-CCNC: 28 IU/L (ref 0–40)
BILIRUB SERPL-MCNC: 0.8 MG/DL (ref 0–1.2)
BUN SERPL-MCNC: 16 MG/DL (ref 6–24)
BUN/CREAT SERPL: 14 (ref 9–20)
CALCIUM SERPL-MCNC: 9.2 MG/DL (ref 8.7–10.2)
CHLORIDE SERPL-SCNC: 105 MMOL/L (ref 96–106)
CK SERPL-CCNC: 135 U/L (ref 41–331)
CO2 SERPL-SCNC: 24 MMOL/L (ref 20–29)
CREAT SERPL-MCNC: 1.16 MG/DL (ref 0.76–1.27)
EGFRCR SERPLBLD CKD-EPI 2021: 75 ML/MIN/1.73
GLOBULIN SER CALC-MCNC: 2.3 G/DL (ref 1.5–4.5)
GLUCOSE SERPL-MCNC: 90 MG/DL (ref 70–99)
MAGNESIUM SERPL-MCNC: 2.3 MG/DL (ref 1.6–2.3)
POTASSIUM SERPL-SCNC: 4.5 MMOL/L (ref 3.5–5.2)
PROT SERPL-MCNC: 6.5 G/DL (ref 6–8.5)
SODIUM SERPL-SCNC: 143 MMOL/L (ref 134–144)

## 2023-07-26 ENCOUNTER — PATIENT MESSAGE (OUTPATIENT)
Dept: FAMILY MEDICINE CLINIC | Facility: CLINIC | Age: 53
End: 2023-07-26

## 2023-08-16 RX ORDER — DICLOFENAC SODIUM 75 MG/1
TABLET, DELAYED RELEASE ORAL
Qty: 60 TABLET | Refills: 0 | Status: SHIPPED | OUTPATIENT
Start: 2023-08-16

## 2023-09-15 DIAGNOSIS — I10 PRIMARY HYPERTENSION: ICD-10-CM

## 2023-09-15 RX ORDER — DICLOFENAC SODIUM 75 MG/1
TABLET, DELAYED RELEASE ORAL
Qty: 60 TABLET | Refills: 0 | Status: SHIPPED | OUTPATIENT
Start: 2023-09-15

## 2023-09-18 ENCOUNTER — OFFICE VISIT (OUTPATIENT)
Dept: FAMILY MEDICINE CLINIC | Facility: CLINIC | Age: 53
End: 2023-09-18
Payer: COMMERCIAL

## 2023-09-18 VITALS
HEART RATE: 61 BPM | DIASTOLIC BLOOD PRESSURE: 90 MMHG | OXYGEN SATURATION: 98 % | HEIGHT: 72 IN | SYSTOLIC BLOOD PRESSURE: 120 MMHG | BODY MASS INDEX: 35.62 KG/M2 | WEIGHT: 263 LBS

## 2023-09-18 DIAGNOSIS — R26.89 IMBALANCE: ICD-10-CM

## 2023-09-18 DIAGNOSIS — R51.9 CHRONIC NONINTRACTABLE HEADACHE, UNSPECIFIED HEADACHE TYPE: ICD-10-CM

## 2023-09-18 DIAGNOSIS — R42 DIZZINESS: Primary | ICD-10-CM

## 2023-09-18 DIAGNOSIS — G89.29 CHRONIC NONINTRACTABLE HEADACHE, UNSPECIFIED HEADACHE TYPE: ICD-10-CM

## 2023-09-18 PROCEDURE — 99214 OFFICE O/P EST MOD 30 MIN: CPT | Performed by: NURSE PRACTITIONER

## 2023-09-18 NOTE — PROGRESS NOTES
"Chief Complaint  Headache    Subjective          Booker De Oliveira presents to [facility]  History of Present Illness  Pt began having headaches 1 month ago. He had a mild daily headache. He denies vision changes, weakness, or altered cognition. He feels that he has had imbalance for the past 6 months. He states the headache has improved for the past week.   Headache    Objective   Vital Signs:   /90   Pulse 61   Ht 182.9 cm (72\")   Wt 119 kg (263 lb)   SpO2 98%   BMI 35.67 kg/m²     Body mass index is 35.67 kg/m².    Review of Systems   Constitutional:  Negative for fatigue and fever.   Respiratory:  Negative for shortness of breath.    Cardiovascular:  Negative for chest pain, palpitations and leg swelling.   Neurological:  Positive for dizziness. Negative for syncope.   Psychiatric/Behavioral:  The patient is not nervous/anxious.         Current Outpatient Medications:     diclofenac (VOLTAREN) 75 MG EC tablet, TAKE 1 TABLET BY MOUTH TWICE DAILY, Disp: 60 tablet, Rfl: 0    Tirzepatide (Mounjaro) 12.5 MG/0.5ML solution pen-injector, Inject 12.5 mg under the skin into the appropriate area as directed 1 (One) Time Per Week., Disp: 2 mL, Rfl: 2    traZODone (DESYREL) 50 MG tablet, TAKE 1 TO 2 TABLETS BY MOUTH EVERY NIGHT AT BEDTIME, Disp: 180 tablet, Rfl: 0    valsartan-hydrochlorothiazide (Diovan HCT) 160-12.5 MG per tablet, Take 1 tablet by mouth Daily., Disp: 90 tablet, Rfl: 1      Allergies: Patient has no known allergies.    Physical Exam  Constitutional:       Appearance: Normal appearance.   HENT:      Head: Normocephalic.   Eyes:      Conjunctiva/sclera: Conjunctivae normal.      Pupils: Pupils are equal, round, and reactive to light.   Cardiovascular:      Rate and Rhythm: Normal rate and regular rhythm.      Heart sounds: Normal heart sounds.   Pulmonary:      Effort: Pulmonary effort is normal.      Breath sounds: Normal breath sounds.   Abdominal:      Tenderness: " There is no abdominal tenderness.   Musculoskeletal:         General: Normal range of motion.   Skin:     General: Skin is warm and dry.      Capillary Refill: Capillary refill takes less than 2 seconds.   Neurological:      General: No focal deficit present.      Mental Status: He is alert and oriented to person, place, and time.   Psychiatric:         Mood and Affect: Mood normal.         Behavior: Behavior normal.         Thought Content: Thought content normal.         Judgment: Judgment normal.        Result Review :                   Assessment and Plan    Diagnoses and all orders for this visit:    1. Dizziness (Primary)  Comments:  MRI ordered. RTC for worsened sx.  Orders:  -     MRI Brain Without Contrast; Future    2. Imbalance  Comments:  MRI ordered. RTC for worsened sx.  Orders:  -     MRI Brain Without Contrast; Future    3. Chronic nonintractable headache, unspecified headache type  Comments:  Headaches have improved for now. MRI ordered. RTC for worsened sx.  Orders:  -     MRI Brain Without Contrast; Future                Follow Up   No follow-ups on file.  Patient was given instructions and counseling regarding his condition or for health maintenance advice. Please see specific information pulled into the AVS if appropriate.     REGINALD Sandoval

## 2023-09-20 RX ORDER — VALSARTAN AND HYDROCHLOROTHIAZIDE 160; 12.5 MG/1; MG/1
1 TABLET, FILM COATED ORAL DAILY
Qty: 90 TABLET | Refills: 1 | Status: SHIPPED | OUTPATIENT
Start: 2023-09-20

## 2023-09-20 RX ORDER — TRAZODONE HYDROCHLORIDE 50 MG/1
TABLET ORAL
Qty: 180 TABLET | Refills: 0 | Status: SHIPPED | OUTPATIENT
Start: 2023-09-20

## 2023-10-09 RX ORDER — TIRZEPATIDE 12.5 MG/.5ML
INJECTION, SOLUTION SUBCUTANEOUS
Qty: 2 ML | Refills: 2 | Status: SHIPPED | OUTPATIENT
Start: 2023-10-09

## 2023-10-17 RX ORDER — DICLOFENAC SODIUM 75 MG/1
TABLET, DELAYED RELEASE ORAL
Qty: 60 TABLET | Refills: 0 | Status: SHIPPED | OUTPATIENT
Start: 2023-10-17

## 2023-10-26 ENCOUNTER — PATIENT MESSAGE (OUTPATIENT)
Dept: FAMILY MEDICINE CLINIC | Facility: CLINIC | Age: 53
End: 2023-10-26

## 2023-11-10 ENCOUNTER — OFFICE VISIT (OUTPATIENT)
Dept: FAMILY MEDICINE CLINIC | Facility: CLINIC | Age: 53
End: 2023-11-10
Payer: COMMERCIAL

## 2023-11-10 VITALS
DIASTOLIC BLOOD PRESSURE: 80 MMHG | HEART RATE: 82 BPM | HEIGHT: 73 IN | SYSTOLIC BLOOD PRESSURE: 130 MMHG | OXYGEN SATURATION: 97 % | BODY MASS INDEX: 35.92 KG/M2 | WEIGHT: 271 LBS

## 2023-11-10 DIAGNOSIS — M48.061 SPINAL STENOSIS, LUMBAR REGION, WITHOUT NEUROGENIC CLAUDICATION: ICD-10-CM

## 2023-11-10 DIAGNOSIS — E11.9 TYPE 2 DIABETES MELLITUS WITHOUT COMPLICATION, WITHOUT LONG-TERM CURRENT USE OF INSULIN: Primary | ICD-10-CM

## 2023-11-10 DIAGNOSIS — Z23 IMMUNIZATION DUE: ICD-10-CM

## 2023-11-10 PROBLEM — Z00.00 ANNUAL PHYSICAL EXAM: Status: RESOLVED | Noted: 2022-08-09 | Resolved: 2023-11-10

## 2023-11-10 RX ORDER — DICLOFENAC SODIUM 75 MG/1
75 TABLET, DELAYED RELEASE ORAL 2 TIMES DAILY
Qty: 60 TABLET | Refills: 5 | Status: SHIPPED | OUTPATIENT
Start: 2023-11-10

## 2023-11-10 RX ORDER — TIRZEPATIDE 12.5 MG/.5ML
12.5 INJECTION, SOLUTION SUBCUTANEOUS WEEKLY
Qty: 2 ML | Refills: 5 | Status: SHIPPED | OUTPATIENT
Start: 2023-11-10

## 2023-11-10 NOTE — PROGRESS NOTES
"Chief Complaint  Immunizations (Update immunizations )    Subjective          Booker De Oliveira presents to [facility]  History of Present Illness  Pt is here for follow up on on DM. He is doing well on Mounjaro. He needs a Tdap vaccine because he is having a new grandchild in December. He takes Diclofenac as needed for pain.       Objective   Vital Signs:   /80   Pulse 82   Ht 185.4 cm (73\")   Wt 123 kg (271 lb)   SpO2 97%   BMI 35.75 kg/m²     Body mass index is 35.75 kg/m².    Review of Systems   Constitutional:  Negative for fatigue and fever.   Respiratory:  Negative for shortness of breath.    Cardiovascular:  Negative for chest pain, palpitations and leg swelling.   Neurological:  Negative for syncope.   Psychiatric/Behavioral:  The patient is not nervous/anxious.           Current Outpatient Medications:     diclofenac (VOLTAREN) 75 MG EC tablet, Take 1 tablet by mouth 2 (Two) Times a Day., Disp: 60 tablet, Rfl: 5    Tirzepatide (Mounjaro) 12.5 MG/0.5ML solution pen-injector, Inject 0.5 mL under the skin into the appropriate area as directed 1 (One) Time Per Week., Disp: 2 mL, Rfl: 5    valsartan-hydrochlorothiazide (DIOVAN-HCT) 160-12.5 MG per tablet, TAKE 1 TABLET BY MOUTH DAILY, Disp: 90 tablet, Rfl: 1      Allergies: Patient has no known allergies.    Physical Exam  Constitutional:       Appearance: Normal appearance.   HENT:      Head: Normocephalic.   Eyes:      Conjunctiva/sclera: Conjunctivae normal.      Pupils: Pupils are equal, round, and reactive to light.   Cardiovascular:      Rate and Rhythm: Normal rate and regular rhythm.      Heart sounds: Normal heart sounds.   Pulmonary:      Effort: Pulmonary effort is normal.      Breath sounds: Normal breath sounds.   Abdominal:      Tenderness: There is no abdominal tenderness.   Musculoskeletal:         General: Normal range of motion.   Skin:     General: Skin is warm and dry.      Capillary Refill: Capillary " refill takes less than 2 seconds.   Neurological:      General: No focal deficit present.      Mental Status: He is alert and oriented to person, place, and time.   Psychiatric:         Mood and Affect: Mood normal.         Behavior: Behavior normal.         Thought Content: Thought content normal.         Judgment: Judgment normal.          Result Review :                   Assessment and Plan    Diagnoses and all orders for this visit:    1. Type 2 diabetes mellitus without complication, without long-term current use of insulin (Primary)  Comments:  Continue Mounjaro. We discussed diet and exercise.  Orders:  -     Tirzepatide (Mounjaro) 12.5 MG/0.5ML solution pen-injector; Inject 0.5 mL under the skin into the appropriate area as directed 1 (One) Time Per Week.  Dispense: 2 mL; Refill: 5    2. Spinal stenosis, lumbar region, without neurogenic claudication  Comments:  Diclofenac as needed.  Orders:  -     diclofenac (VOLTAREN) 75 MG EC tablet; Take 1 tablet by mouth 2 (Two) Times a Day.  Dispense: 60 tablet; Refill: 5    3. Immunization due  Comments:  Tdap and flu vaccine given.  Orders:  -     Fluzone >6 Months (9420-3991)  -     Tdap Vaccine => 8yo IM (BOOSTRIX)                Follow Up   Return in about 6 months (around 5/10/2024) for Recheck.  Patient was given instructions and counseling regarding his condition or for health maintenance advice. Please see specific information pulled into the AVS if appropriate.     REGINALD Sandoval

## 2023-12-15 RX ORDER — TRAZODONE HYDROCHLORIDE 50 MG/1
TABLET ORAL
Qty: 180 TABLET | Refills: 0 | OUTPATIENT
Start: 2023-12-15

## 2024-01-01 DIAGNOSIS — E11.9 TYPE 2 DIABETES MELLITUS WITHOUT COMPLICATION, WITHOUT LONG-TERM CURRENT USE OF INSULIN: ICD-10-CM

## 2024-01-03 RX ORDER — TIRZEPATIDE 12.5 MG/.5ML
INJECTION, SOLUTION SUBCUTANEOUS
Qty: 6 ML | Refills: 0 | Status: SHIPPED | OUTPATIENT
Start: 2024-01-03

## 2024-01-11 ENCOUNTER — OFFICE VISIT (OUTPATIENT)
Dept: FAMILY MEDICINE CLINIC | Facility: CLINIC | Age: 54
End: 2024-01-11
Payer: COMMERCIAL

## 2024-01-11 VITALS
WEIGHT: 291 LBS | SYSTOLIC BLOOD PRESSURE: 140 MMHG | HEART RATE: 75 BPM | BODY MASS INDEX: 38.57 KG/M2 | HEIGHT: 73 IN | TEMPERATURE: 98.2 F | OXYGEN SATURATION: 97 % | DIASTOLIC BLOOD PRESSURE: 80 MMHG

## 2024-01-11 DIAGNOSIS — H61.21 IMPACTED CERUMEN OF RIGHT EAR: Primary | ICD-10-CM

## 2024-01-11 RX ORDER — TRIAMCINOLONE ACETONIDE 5 MG/G
1 CREAM TOPICAL EVERY 12 HOURS SCHEDULED
COMMUNITY
Start: 2024-01-02

## 2024-01-11 RX ORDER — FLUTICASONE PROPIONATE 50 MCG
SPRAY, SUSPENSION (ML) NASAL EVERY 24 HOURS
COMMUNITY
Start: 2024-01-02

## 2024-01-11 RX ORDER — LEVOCETIRIZINE DIHYDROCHLORIDE 5 MG/1
TABLET, FILM COATED ORAL EVERY 24 HOURS
COMMUNITY
Start: 2024-01-02 | End: 2024-02-01

## 2024-01-11 NOTE — PROGRESS NOTES
"Chief Complaint  Earache (Pt states right ear fullness and hearing loss  going on 3 weeks )    Subjective          Booker De Oliveira presents to [facility]  History of Present Illness  Patient in today for evaluation on fullness to right ear for past 3 weeks. Denies fever. Denies any significant nasal congestion.  Denies nausea, vomiting or diarrhea.  has hearing loss to this ear as well. He had gone to [facility] and was given some flonase and xyzal but has only noted minimal benefit. Denies any ear trauma, surgery to ears or TM tubes.  has had issues in past with recurrent cerumen impaction and had to have ears irrigated.   Earache   There is pain in the right ear. Pertinent negatives include no abdominal pain, coughing, diarrhea, drainage, headaches, sore throat or vomiting.       Objective   Vital Signs:   /80   Pulse 75   Temp 98.2 °F (36.8 °C)   Ht 185.4 cm (73\")   Wt 132 kg (291 lb)   SpO2 97%   BMI 38.39 kg/m²     Body mass index is 38.39 kg/m².    Review of Systems   Constitutional:  Negative for fever.   HENT:  Positive for ear pain. Negative for congestion and sore throat.    Respiratory:  Negative for cough and shortness of breath.    Cardiovascular:  Negative for chest pain.   Gastrointestinal:  Negative for abdominal pain, diarrhea, nausea and vomiting.   Neurological:  Negative for dizziness and headache.       Past History:  Medical History: has a past medical history of Benign essential HTN, Hepatitis B, Hyperlipidemia, Hypertension, Low back pain, Sleep apnea, and Type 2 diabetes mellitus.   Surgical History: has a past surgical history that includes Shoulder arthroscopy (Left); Cholecystectomy (2013); Appendectomy; and Hernia repair.   Family History: family history includes Arthritis in his maternal grandfather and maternal grandmother; Cancer in his father; Coronary artery disease in his mother; Diabetes in his father and paternal aunt; Heart disease " in his mother; Hyperlipidemia in his mother; Hypertension in his father; Liver cancer in his father.   Social History: reports that he has never smoked. He has never used smokeless tobacco. He reports that he does not drink alcohol and does not use drugs.      Current Outpatient Medications:     diclofenac (VOLTAREN) 75 MG EC tablet, Take 1 tablet by mouth 2 (Two) Times a Day., Disp: 60 tablet, Rfl: 5    fluticasone (FLONASE) 50 MCG/ACT nasal spray, Daily., Disp: , Rfl:     levocetirizine (XYZAL) 5 MG tablet, Daily., Disp: , Rfl:     Tirzepatide (Mounjaro) 12.5 MG/0.5ML solution pen-injector pen, INJECT 12.5 MG SUBCUTANEOUSLY INTO THE APPROPRIATE AREA AS DIRECTED ONCE A WEEK, Disp: 6 mL, Rfl: 0    triamcinolone (KENALOG) 0.5 % cream, 1 application  Every 12 (Twelve) Hours., Disp: , Rfl:     valsartan-hydrochlorothiazide (DIOVAN-HCT) 160-12.5 MG per tablet, TAKE 1 TABLET BY MOUTH DAILY, Disp: 90 tablet, Rfl: 1  Allergies: Patient has no known allergies.    Physical Exam  Constitutional:       Appearance: Normal appearance.   HENT:      Right Ear: Tympanic membrane normal. There is impacted cerumen.      Left Ear: Tympanic membrane and external ear normal.      Mouth/Throat:      Pharynx: Oropharynx is clear.   Eyes:      Conjunctiva/sclera: Conjunctivae normal.      Pupils: Pupils are equal, round, and reactive to light.   Cardiovascular:      Rate and Rhythm: Normal rate and regular rhythm.      Heart sounds: Normal heart sounds.   Pulmonary:      Effort: Pulmonary effort is normal.      Breath sounds: Normal breath sounds.   Abdominal:      Tenderness: There is no abdominal tenderness.   Neurological:      Mental Status: He is oriented to person, place, and time.   Psychiatric:         Mood and Affect: Mood normal.         Behavior: Behavior normal.        Ear Cerumen Removal    Date/Time: 1/11/2024 2:46 PM    Performed by: Tereza Palacio PA-C  Authorized by: Tereza Palacio PA-C  Location details: right  ear  Patient tolerance: patient tolerated the procedure well with no immediate complications  Procedure type: irrigation           Assessment and Plan   Diagnoses and all orders for this visit:    1. Impacted cerumen of right ear (Primary)  Cerumen removed from right ear. Patient tolerated well. Following removal, TM and external canal appear normal. Recommend to continue with flonase. If symptoms not improving or hearing not improving, recommend ENT evaluation.           Follow Up   No follow-ups on file.  Patient was given instructions and counseling regarding his condition or for health maintenance advice. Please see specific information pulled into the AVS if appropriate.     Tereza Palacio PA-C

## 2024-01-12 ENCOUNTER — PATIENT MESSAGE (OUTPATIENT)
Dept: FAMILY MEDICINE CLINIC | Facility: CLINIC | Age: 54
End: 2024-01-12
Payer: COMMERCIAL

## 2024-01-16 DIAGNOSIS — H93.8X1 EAR FULLNESS, RIGHT: Primary | ICD-10-CM

## 2024-01-21 ENCOUNTER — PATIENT MESSAGE (OUTPATIENT)
Dept: FAMILY MEDICINE CLINIC | Facility: CLINIC | Age: 54
End: 2024-01-21
Payer: COMMERCIAL

## 2024-01-22 NOTE — TELEPHONE ENCOUNTER
From: Booker De Oliveira  To: Juan Miguel Dominguez  Sent: 1/21/2024 1:02 PM EST  Subject: Shots for going to [place]    My wife and I are going to [place] in late April and early May, are there particular shots you would recommend we get?

## 2024-02-15 ENCOUNTER — PATIENT MESSAGE (OUTPATIENT)
Dept: FAMILY MEDICINE CLINIC | Facility: CLINIC | Age: 54
End: 2024-02-15
Payer: COMMERCIAL

## 2024-02-23 ENCOUNTER — OFFICE VISIT (OUTPATIENT)
Dept: FAMILY MEDICINE CLINIC | Facility: CLINIC | Age: 54
End: 2024-02-23
Payer: COMMERCIAL

## 2024-02-23 VITALS
DIASTOLIC BLOOD PRESSURE: 100 MMHG | WEIGHT: 297 LBS | SYSTOLIC BLOOD PRESSURE: 158 MMHG | HEIGHT: 73 IN | HEART RATE: 55 BPM | OXYGEN SATURATION: 98 % | BODY MASS INDEX: 39.36 KG/M2

## 2024-02-23 DIAGNOSIS — E66.01 MORBID (SEVERE) OBESITY DUE TO EXCESS CALORIES: ICD-10-CM

## 2024-02-23 DIAGNOSIS — E11.9 TYPE 2 DIABETES MELLITUS WITHOUT COMPLICATION, WITHOUT LONG-TERM CURRENT USE OF INSULIN: ICD-10-CM

## 2024-02-23 DIAGNOSIS — E11.9 TYPE 2 DIABETES MELLITUS WITHOUT COMPLICATION, WITHOUT LONG-TERM CURRENT USE OF INSULIN: Primary | ICD-10-CM

## 2024-02-23 DIAGNOSIS — I10 PRIMARY HYPERTENSION: ICD-10-CM

## 2024-02-23 LAB
EXPIRATION DATE: NORMAL
HBA1C MFR BLD: 5.6 % (ref 4.5–5.7)
Lab: NORMAL

## 2024-02-23 RX ORDER — PHENTERMINE HYDROCHLORIDE 37.5 MG/1
37.5 TABLET ORAL
Qty: 30 TABLET | Refills: 0 | Status: SHIPPED | OUTPATIENT
Start: 2024-02-23

## 2024-02-23 RX ORDER — VALSARTAN AND HYDROCHLOROTHIAZIDE 160; 12.5 MG/1; MG/1
1 TABLET, FILM COATED ORAL DAILY
Qty: 90 TABLET | Refills: 1 | Status: SHIPPED | OUTPATIENT
Start: 2024-02-23

## 2024-02-23 NOTE — PROGRESS NOTES
Follow Up Office Visit      Date of Visit:  2024   Patient Name: Booker De Oliveira  : 1970   MRN: [id number]     Chief Complaint:    Chief Complaint   Patient presents with    discuss weight    Diabetes       History of Present Illness: Booker De Oliveira is a 53 y.o. male who is here today for follow up.  Patient here to discuss diabetes management.  Having a difficult time getting the Mounjaro medication.  Patient is due for blood work for his hypertension and diabetes.  A1c is relatively stable.  Blood pressure somewhat elevated today.  Also struggling more with weight recently.  Appetite not controlled.  Trying to exercise.        Subjective      Review of Systems:   Review of Systems   Constitutional:  Negative for fatigue and fever.   HENT:  Negative for congestion and ear pain.    Respiratory:  Negative for apnea, cough, chest tightness and shortness of breath.    Cardiovascular:  Negative for chest pain.   Gastrointestinal:  Negative for abdominal pain, constipation, diarrhea and nausea.   Musculoskeletal:  Negative for arthralgias.   Psychiatric/Behavioral:  Negative for depressed mood and stress.        Past Medical History:   Past Medical History:   Diagnosis Date    Benign essential HTN     Hepatitis B     Hyperlipidemia     Hypertension     Low back pain     Sleep apnea     Type 2 diabetes mellitus        Past Surgical History:   Past Surgical History:   Procedure Laterality Date    APPENDECTOMY      CHOLECYSTECTOMY      [facility] [place]    HERNIA REPAIR      umbilical x 2    [place] [facility] [place] [facility]       Family History:   Family History   Problem Relation Age of Onset    Heart disease Mother     Coronary artery disease Mother     Hyperlipidemia Mother         Majority of family    Cancer Father         Pancreatic cancer    Hypertension Father     Liver cancer Father     Diabetes Father     Arthritis Maternal Grandfather     Arthritis Maternal  "Grandmother     Diabetes Paternal Aunt        Social History:   Social History     Socioeconomic History    Marital status:    Tobacco Use    Smoking status: Never    Smokeless tobacco: Never   Vaping Use    Vaping Use: Never used   Substance and Sexual Activity    Alcohol use: Never    Drug use: Never    Sexual activity: Yes     Partners: Female       Medications:     Current Outpatient Medications:     Tirzepatide (Mounjaro) 12.5 MG/0.5ML solution pen-injector pen, Inject 0.5 mL under the skin into the appropriate area as directed 1 (One) Time Per Week., Disp: 6 mL, Rfl: 1    valsartan-hydrochlorothiazide (DIOVAN-HCT) 160-12.5 MG per tablet, Take 1 tablet by mouth Daily., Disp: 90 tablet, Rfl: 1    diclofenac (VOLTAREN) 75 MG EC tablet, Take 1 tablet by mouth 2 (Two) Times a Day., Disp: 60 tablet, Rfl: 5    fluticasone (FLONASE) 50 MCG/ACT nasal spray, Daily., Disp: , Rfl:     levocetirizine (XYZAL) 5 MG tablet, Daily., Disp: , Rfl:     phentermine (ADIPEX-P) 37.5 MG tablet, Take 1 tablet by mouth Every Morning Before Breakfast., Disp: 30 tablet, Rfl: 0    triamcinolone (KENALOG) 0.5 % cream, 1 application  Every 12 (Twelve) Hours., Disp: , Rfl:     Allergies:   No Known Allergies    Objective     Physical Exam:  Vital Signs:   Vitals:    02/23/24 0903   BP: 158/100   Pulse: 55   SpO2: 98%   Weight: 135 kg (297 lb)   Height: 185.4 cm (73\")     Body mass index is 39.18 kg/m².     Physical Exam  Vitals and nursing note reviewed.   Constitutional:       General: He is not in acute distress.     Appearance: Normal appearance. He is not ill-appearing.   HENT:      Head: Normocephalic and atraumatic.      Right Ear: Tympanic membrane and ear canal normal.      Left Ear: Tympanic membrane and ear canal normal.      Nose: Nose normal.   Cardiovascular:      Rate and Rhythm: Normal rate and regular rhythm.      Heart sounds: Normal heart sounds.   Pulmonary:      Effort: Pulmonary effort is normal.      Breath " sounds: Normal breath sounds.   Neurological:      Mental Status: He is alert and oriented to person, place, and time. Mental status is at baseline.   Psychiatric:         Mood and Affect: Mood normal.         Procedures      Assessment / Plan      Assessment/Plan:   Diagnoses and all orders for this visit:    1. Type 2 diabetes mellitus without complication, without long-term current use of insulin (Primary)  -     POC Glycosylated Hemoglobin (Hb A1C)  -     Tirzepatide (Mounjaro) 12.5 MG/0.5ML solution pen-injector pen; Inject 0.5 mL under the skin into the appropriate area as directed 1 (One) Time Per Week.  Dispense: 6 mL; Refill: 1    2. Type 2 diabetes mellitus without complication, without long-term current use of insulin  Comments:  Continue Mounjaro. We discussed diet and exercise.  Orders:  -     POC Glycosylated Hemoglobin (Hb A1C)  -     Tirzepatide (Mounjaro) 12.5 MG/0.5ML solution pen-injector pen; Inject 0.5 mL under the skin into the appropriate area as directed 1 (One) Time Per Week.  Dispense: 6 mL; Refill: 1    3. Primary hypertension  Comments:  Lower DiovanHCT to 160/12.5mg. Monitor BP.  Orders:  -     valsartan-hydrochlorothiazide (DIOVAN-HCT) 160-12.5 MG per tablet; Take 1 tablet by mouth Daily.  Dispense: 90 tablet; Refill: 1  -     CBC Auto Differential  -     Comprehensive Metabolic Panel  -     Lipid Panel  -     TSH    4. Morbid (severe) obesity due to excess calories  -     phentermine (ADIPEX-P) 37.5 MG tablet; Take 1 tablet by mouth Every Morning Before Breakfast.  Dispense: 30 tablet; Refill: 0         Refill chronic medications.  Check appropriate blood work.  Try to get the Mounjaro through the [employer] company.  Phentermine.  Recheck 1 month    Follow Up:   No follow-ups on file.    Juan Miguel Dominguez  [facility] [place]

## 2024-02-24 LAB
ALBUMIN SERPL-MCNC: 4.2 G/DL (ref 3.8–4.9)
ALBUMIN/GLOB SERPL: 1.6 {RATIO} (ref 1.2–2.2)
ALP SERPL-CCNC: 81 IU/L (ref 44–121)
ALT SERPL-CCNC: 25 IU/L (ref 0–44)
AST SERPL-CCNC: 20 IU/L (ref 0–40)
BASOPHILS # BLD AUTO: 0.1 X10E3/UL (ref 0–0.2)
BASOPHILS NFR BLD AUTO: 1 %
BILIRUB SERPL-MCNC: 0.6 MG/DL (ref 0–1.2)
BUN SERPL-MCNC: 18 MG/DL (ref 6–24)
BUN/CREAT SERPL: 15 (ref 9–20)
CALCIUM SERPL-MCNC: 9.6 MG/DL (ref 8.7–10.2)
CHLORIDE SERPL-SCNC: 103 MMOL/L (ref 96–106)
CHOLEST SERPL-MCNC: 130 MG/DL (ref 100–199)
CO2 SERPL-SCNC: 27 MMOL/L (ref 20–29)
CREAT SERPL-MCNC: 1.21 MG/DL (ref 0.76–1.27)
EGFRCR SERPLBLD CKD-EPI 2021: 71 ML/MIN/1.73
EOSINOPHIL # BLD AUTO: 0.5 X10E3/UL (ref 0–0.4)
EOSINOPHIL NFR BLD AUTO: 5 %
ERYTHROCYTE [DISTWIDTH] IN BLOOD BY AUTOMATED COUNT: 12.6 % (ref 11.6–15.4)
GLOBULIN SER CALC-MCNC: 2.6 G/DL (ref 1.5–4.5)
GLUCOSE SERPL-MCNC: 126 MG/DL (ref 70–99)
HCT VFR BLD AUTO: 47 % (ref 37.5–51)
HDLC SERPL-MCNC: 48 MG/DL
HGB BLD-MCNC: 15.9 G/DL (ref 13–17.7)
IMM GRANULOCYTES # BLD AUTO: 0 X10E3/UL (ref 0–0.1)
IMM GRANULOCYTES NFR BLD AUTO: 0 %
LDLC SERPL CALC-MCNC: 66 MG/DL (ref 0–99)
LYMPHOCYTES # BLD AUTO: 2.3 X10E3/UL (ref 0.7–3.1)
LYMPHOCYTES NFR BLD AUTO: 26 %
MCH RBC QN AUTO: 31 PG (ref 26.6–33)
MCHC RBC AUTO-ENTMCNC: 33.8 G/DL (ref 31.5–35.7)
MCV RBC AUTO: 92 FL (ref 79–97)
MONOCYTES # BLD AUTO: 0.7 X10E3/UL (ref 0.1–0.9)
MONOCYTES NFR BLD AUTO: 8 %
NEUTROPHILS # BLD AUTO: 5.2 X10E3/UL (ref 1.4–7)
NEUTROPHILS NFR BLD AUTO: 60 %
PLATELET # BLD AUTO: 254 X10E3/UL (ref 150–450)
POTASSIUM SERPL-SCNC: 5.1 MMOL/L (ref 3.5–5.2)
PROT SERPL-MCNC: 6.8 G/DL (ref 6–8.5)
RBC # BLD AUTO: 5.13 X10E6/UL (ref 4.14–5.8)
SODIUM SERPL-SCNC: 143 MMOL/L (ref 134–144)
TRIGL SERPL-MCNC: 80 MG/DL (ref 0–149)
TSH SERPL DL<=0.005 MIU/L-ACNC: 1.55 UIU/ML (ref 0.45–4.5)
VLDLC SERPL CALC-MCNC: 16 MG/DL (ref 5–40)
WBC # BLD AUTO: 8.7 X10E3/UL (ref 3.4–10.8)

## 2024-03-15 ENCOUNTER — PATIENT MESSAGE (OUTPATIENT)
Dept: FAMILY MEDICINE CLINIC | Facility: CLINIC | Age: 54
End: 2024-03-15

## 2024-03-15 ENCOUNTER — OFFICE VISIT (OUTPATIENT)
Dept: FAMILY MEDICINE CLINIC | Facility: CLINIC | Age: 54
End: 2024-03-15
Payer: COMMERCIAL

## 2024-03-15 VITALS
BODY MASS INDEX: 36.45 KG/M2 | DIASTOLIC BLOOD PRESSURE: 96 MMHG | HEART RATE: 76 BPM | WEIGHT: 275 LBS | HEIGHT: 73 IN | SYSTOLIC BLOOD PRESSURE: 130 MMHG | OXYGEN SATURATION: 100 %

## 2024-03-15 DIAGNOSIS — E11.9 TYPE 2 DIABETES MELLITUS WITHOUT COMPLICATION, WITHOUT LONG-TERM CURRENT USE OF INSULIN: Primary | ICD-10-CM

## 2024-03-15 DIAGNOSIS — E66.01 MORBID (SEVERE) OBESITY DUE TO EXCESS CALORIES: ICD-10-CM

## 2024-03-15 RX ORDER — PHENTERMINE HYDROCHLORIDE 37.5 MG/1
37.5 TABLET ORAL
Qty: 30 TABLET | Refills: 0 | Status: SHIPPED | OUTPATIENT
Start: 2024-03-15

## 2024-03-16 NOTE — PROGRESS NOTES
Follow Up Office Visit      Date of Visit:  03/15/2024   Patient Name: Booker De Oliveira  : 1970   MRN: [id number]     Chief Complaint:    Chief Complaint   Patient presents with    Weight Check       History of Present Illness: Booker De Oliveira is a 53 y.o. male who is here today for follow up.  Patient seen today for recheck on his diabetes and obesity.  Patient currently taking Mounjaro for help with his diabetes.  Overall condition has greatly improved with this medication.  He does better on this than anything else.  It also does help with weight.  Patient also taking phentermine for weight loss currently and there is no side effects.  Does need refills.        Subjective      Review of Systems:   Review of Systems   Constitutional:  Negative for fatigue and fever.   HENT:  Negative for congestion and ear pain.    Respiratory:  Negative for apnea, cough, chest tightness and shortness of breath.    Cardiovascular:  Negative for chest pain.   Gastrointestinal:  Negative for abdominal pain, constipation, diarrhea and nausea.   Musculoskeletal:  Negative for arthralgias.   Psychiatric/Behavioral:  Negative for depressed mood and stress.        Past Medical History:   Past Medical History:   Diagnosis Date    Benign essential HTN     Hepatitis B     Hyperlipidemia     Hypertension     Low back pain     Sleep apnea     Type 2 diabetes mellitus        Past Surgical History:   Past Surgical History:   Procedure Laterality Date    APPENDECTOMY      CHOLECYSTECTOMY      [facility] [place]    HERNIA REPAIR      umbilical x 2    SHOULDER ARTHROSCOPY Left     x2- [facility] [place], [facility]       Family History:   Family History   Problem Relation Age of Onset    Heart disease Mother     Coronary artery disease Mother     Hyperlipidemia Mother         Majority of family    Cancer Father         Pancreatic cancer    Hypertension Father     Liver cancer Father     Diabetes Father     Arthritis Maternal Grandfather   "   Arthritis Maternal Grandmother     Diabetes Paternal Aunt        Social History:   Social History     Socioeconomic History    Marital status:    Tobacco Use    Smoking status: Never    Smokeless tobacco: Never   Vaping Use    Vaping status: Never Used   Substance and Sexual Activity    Alcohol use: Never    Drug use: Never    Sexual activity: Yes     Partners: Female       Medications:     Current Outpatient Medications:     phentermine (ADIPEX-P) 37.5 MG tablet, Take 1 tablet by mouth Every Morning Before Breakfast., Disp: 30 tablet, Rfl: 0    diclofenac (VOLTAREN) 75 MG EC tablet, Take 1 tablet by mouth 2 (Two) Times a Day., Disp: 60 tablet, Rfl: 5    fluticasone (FLONASE) 50 MCG/ACT nasal spray, Daily., Disp: , Rfl:     levocetirizine (XYZAL) 5 MG tablet, Daily., Disp: , Rfl:     Tirzepatide (Mounjaro) 12.5 MG/0.5ML solution pen-injector pen, Inject 0.5 mL under the skin into the appropriate area as directed 1 (One) Time Per Week., Disp: 6 mL, Rfl: 1    triamcinolone (KENALOG) 0.5 % cream, 1 application  Every 12 (Twelve) Hours., Disp: , Rfl:     valsartan-hydrochlorothiazide (DIOVAN-HCT) 160-12.5 MG per tablet, Take 1 tablet by mouth Daily., Disp: 90 tablet, Rfl: 1    Allergies:   No Known Allergies    Objective     Physical Exam:  Vital Signs:   Vitals:    03/15/24 1342   BP: 130/96   Pulse: 76   SpO2: 100%   Weight: 125 kg (275 lb)   Height: 185.4 cm (73\")     Body mass index is 36.28 kg/m².     Physical Exam  Vitals and nursing note reviewed.   Constitutional:       General: He is not in acute distress.     Appearance: Normal appearance. He is not ill-appearing.   HENT:      Head: Normocephalic and atraumatic.      Right Ear: Tympanic membrane and ear canal normal.      Left Ear: Tympanic membrane and ear canal normal.      Nose: Nose normal.   Cardiovascular:      Rate and Rhythm: Normal rate and regular rhythm.      Heart sounds: Normal heart sounds.   Pulmonary:      Effort: Pulmonary effort is " normal.      Breath sounds: Normal breath sounds.   Neurological:      Mental Status: He is alert and oriented to person, place, and time. Mental status is at baseline.   Psychiatric:         Mood and Affect: Mood normal.         Procedures      Assessment / Plan      Assessment/Plan:   Diagnoses and all orders for this visit:    1. Type 2 diabetes mellitus without complication, without long-term current use of insulin (Primary)    2. Morbid (severe) obesity due to excess calories  -     phentermine (ADIPEX-P) 37.5 MG tablet; Take 1 tablet by mouth Every Morning Before Breakfast.  Dispense: 30 tablet; Refill: 0         No changes in current medications.  Refilled phentermine.  Continue current dose of Mounjaro.    Follow Up:   No follow-ups on file.    Juan Miguel Dominguez  [facility] [place]

## 2024-03-17 NOTE — TELEPHONE ENCOUNTER
From: Booker De Oliveira  To: Juan Miguel Dominguez  Sent: 3/15/2024 2:50 PM EDT  Subject: Heartburn     At my appointment today, I totally forgot about my major heartburn issues. I have been taking some Pepcid to help, but it was really, really bad a couple of days in a row.

## 2024-03-27 ENCOUNTER — TELEPHONE (OUTPATIENT)
Dept: FAMILY MEDICINE CLINIC | Facility: CLINIC | Age: 54
End: 2024-03-27

## 2024-03-27 NOTE — TELEPHONE ENCOUNTER
Caller: Booker De Oliveira    Relationship: Self    Best call back number:  [phone]     Who are you requesting to speak with (clinical staff, provider,  specific staff member): DR SINGH/CLINICAL    What was the call regarding: PLEASE READ MESSAGES PATIENT SENT IN [email or web address] AND RESPOND ASAP.  THANK YOU.

## 2024-04-22 ENCOUNTER — OFFICE VISIT (OUTPATIENT)
Dept: FAMILY MEDICINE CLINIC | Facility: CLINIC | Age: 54
End: 2024-04-22
Payer: COMMERCIAL

## 2024-04-22 VITALS
HEART RATE: 63 BPM | HEIGHT: 73 IN | WEIGHT: 271 LBS | BODY MASS INDEX: 35.92 KG/M2 | SYSTOLIC BLOOD PRESSURE: 150 MMHG | DIASTOLIC BLOOD PRESSURE: 70 MMHG | OXYGEN SATURATION: 98 %

## 2024-04-22 DIAGNOSIS — E11.9 TYPE 2 DIABETES MELLITUS WITHOUT COMPLICATION, WITHOUT LONG-TERM CURRENT USE OF INSULIN: Primary | ICD-10-CM

## 2024-04-22 DIAGNOSIS — E66.01 MORBID (SEVERE) OBESITY DUE TO EXCESS CALORIES: ICD-10-CM

## 2024-04-22 PROCEDURE — 99214 OFFICE O/P EST MOD 30 MIN: CPT | Performed by: FAMILY MEDICINE

## 2024-04-22 RX ORDER — PHENTERMINE HYDROCHLORIDE 37.5 MG/1
37.5 TABLET ORAL
Qty: 30 TABLET | Refills: 0 | Status: SHIPPED | OUTPATIENT
Start: 2024-04-22

## 2024-04-22 RX ORDER — SEMAGLUTIDE 0.68 MG/ML
0.5 INJECTION, SOLUTION SUBCUTANEOUS WEEKLY
Qty: 3 ML | Refills: 1 | Status: SHIPPED | OUTPATIENT
Start: 2024-04-22

## 2024-04-22 NOTE — PROGRESS NOTES
Follow Up Office Visit      Date of Visit:  2024   Patient Name: Booker De Oliveira  : 1970   MRN: [id number]     Chief Complaint:    Chief Complaint   Patient presents with    Hypertension       History of Present Illness: Booker De Oliveira is a 53 y.o. male who is here today for follow up.  Patient seen today in follow-up of his diabetes as well as his obesity.  Patient has continued to lose weight with no side effects of phentermine.  Overall doing very well with his diabetes as well on the Ozempic.  Does need to increase dose to maximize effect.        Subjective      Review of Systems:   Review of Systems   Constitutional:  Negative for fatigue and fever.   HENT:  Negative for congestion and ear pain.    Respiratory:  Negative for apnea, cough, chest tightness and shortness of breath.    Cardiovascular:  Negative for chest pain.   Gastrointestinal:  Negative for abdominal pain, constipation, diarrhea and nausea.   Musculoskeletal:  Negative for arthralgias.   Psychiatric/Behavioral:  Negative for depressed mood and stress.        Past Medical History:   Past Medical History:   Diagnosis Date    Benign essential HTN     Hepatitis B     Hyperlipidemia     Hypertension     Low back pain     Sleep apnea     Type 2 diabetes mellitus        Past Surgical History:   Past Surgical History:   Procedure Laterality Date    APPENDECTOMY      CHOLECYSTECTOMY      [place], [place]    HERNIA REPAIR      umbilical x 2    SHOULDER ARTHROSCOPY Left     x2- [place], [facility]       Family History:   Family History   Problem Relation Age of Onset    Heart disease Mother     Coronary artery disease Mother     Hyperlipidemia Mother         Majority of family    Cancer Father         Pancreatic cancer    Hypertension Father     Liver cancer Father     Diabetes Father     Arthritis Maternal Grandfather     Arthritis Maternal Grandmother     Diabetes Paternal Aunt        Social History:   Social History  "    Socioeconomic History    Marital status:    Tobacco Use    Smoking status: Never    Smokeless tobacco: Never   Vaping Use    Vaping status: Never Used   Substance and Sexual Activity    Alcohol use: Never    Drug use: Never    Sexual activity: Yes     Partners: Female       Medications:     Current Outpatient Medications:     phentermine (ADIPEX-P) 37.5 MG tablet, Take 1 tablet by mouth Every Morning Before Breakfast., Disp: 30 tablet, Rfl: 0    Semaglutide,0.25 or 0.5MG/DOS, (Ozempic, 0.25 or 0.5 MG/DOSE,) 2 MG/3ML solution pen-injector, Inject 0.5 mg under the skin into the appropriate area as directed 1 (One) Time Per Week., Disp: 3 mL, Rfl: 1    diclofenac (VOLTAREN) 75 MG EC tablet, Take 1 tablet by mouth 2 (Two) Times a Day., Disp: 60 tablet, Rfl: 5    fluticasone (FLONASE) 50 MCG/ACT nasal spray, Daily., Disp: , Rfl:     levocetirizine (XYZAL) 5 MG tablet, Daily., Disp: , Rfl:     triamcinolone (KENALOG) 0.5 % cream, 1 application  Every 12 (Twelve) Hours., Disp: , Rfl:     valsartan-hydrochlorothiazide (DIOVAN-HCT) 160-12.5 MG per tablet, Take 1 tablet by mouth Daily., Disp: 90 tablet, Rfl: 1    Allergies:   No Known Allergies    Objective     Physical Exam:  Vital Signs:   Vitals:    04/22/24 1420   BP: 150/70   Pulse: 63   SpO2: 98%   Weight: 123 kg (271 lb)   Height: 185.4 cm (73\")     Body mass index is 35.75 kg/m².     Physical Exam  Vitals and nursing note reviewed.   Constitutional:       General: He is not in acute distress.     Appearance: Normal appearance. He is not ill-appearing.   HENT:      Head: Normocephalic and atraumatic.      Right Ear: Tympanic membrane and ear canal normal.      Left Ear: Tympanic membrane and ear canal normal.      Nose: Nose normal.   Cardiovascular:      Rate and Rhythm: Normal rate and regular rhythm.      Heart sounds: Normal heart sounds.   Pulmonary:      Effort: Pulmonary effort is normal.      Breath sounds: Normal breath sounds.   Neurological:      " Mental Status: He is alert and oriented to person, place, and time. Mental status is at baseline.   Psychiatric:         Mood and Affect: Mood normal.         Procedures      Assessment / Plan      Assessment/Plan:   Diagnoses and all orders for this visit:    1. Type 2 diabetes mellitus without complication, without long-term current use of insulin (Primary)  -     Semaglutide,0.25 or 0.5MG/DOS, (Ozempic, 0.25 or 0.5 MG/DOSE,) 2 MG/3ML solution pen-injector; Inject 0.5 mg under the skin into the appropriate area as directed 1 (One) Time Per Week.  Dispense: 3 mL; Refill: 1    2. Morbid (severe) obesity due to excess calories  -     phentermine (ADIPEX-P) 37.5 MG tablet; Take 1 tablet by mouth Every Morning Before Breakfast.  Dispense: 30 tablet; Refill: 0         Increase strength of Ozempic.  Continue phentermine.  Continue working on diet and exercise.    Follow Up:   No follow-ups on file.    Juan Miguel Dominguez  [facility] [place]

## 2024-05-02 ENCOUNTER — PATIENT MESSAGE (OUTPATIENT)
Dept: FAMILY MEDICINE CLINIC | Facility: CLINIC | Age: 54
End: 2024-05-02
Payer: COMMERCIAL

## 2024-05-02 DIAGNOSIS — M48.061 SPINAL STENOSIS, LUMBAR REGION, WITHOUT NEUROGENIC CLAUDICATION: ICD-10-CM

## 2024-05-02 RX ORDER — DICLOFENAC SODIUM 75 MG/1
75 TABLET, DELAYED RELEASE ORAL 2 TIMES DAILY
Qty: 60 TABLET | Refills: 5 | Status: SHIPPED | OUTPATIENT
Start: 2024-05-02 | End: 2024-05-05 | Stop reason: SDUPTHER

## 2024-05-02 NOTE — TELEPHONE ENCOUNTER
Patient comment: Every joint I have is hurting. I think i need to resume taking this. Please send to [facility] in [place] AS SOON AS POSSIBLE!!

## 2024-05-05 RX ORDER — DICLOFENAC SODIUM 75 MG/1
75 TABLET, DELAYED RELEASE ORAL 2 TIMES DAILY
Qty: 60 TABLET | Refills: 5 | Status: SHIPPED | OUTPATIENT
Start: 2024-05-05

## 2024-05-05 NOTE — TELEPHONE ENCOUNTER
From: Booker De Oliveira  To: Juan Miguel Dominguez  Sent: 5/2/2024 3:31 AM EDT  Subject: Dicoflenac     I am having extreme pain in most joints. I had quit taking dicoflenac. Please send into [facility] pharmacy ASAP

## 2024-06-04 DIAGNOSIS — E11.9 TYPE 2 DIABETES MELLITUS WITHOUT COMPLICATION, WITHOUT LONG-TERM CURRENT USE OF INSULIN: ICD-10-CM

## 2024-06-06 RX ORDER — SEMAGLUTIDE 0.68 MG/ML
0.5 INJECTION, SOLUTION SUBCUTANEOUS WEEKLY
Qty: 3 ML | Refills: 1 | Status: SHIPPED | OUTPATIENT
Start: 2024-06-06

## 2024-06-18 ENCOUNTER — PATIENT MESSAGE (OUTPATIENT)
Dept: FAMILY MEDICINE CLINIC | Facility: CLINIC | Age: 54
End: 2024-06-18
Payer: COMMERCIAL

## 2024-06-19 NOTE — TELEPHONE ENCOUNTER
From: Booker De Oliveira  To: Juan Miguel Dominguez  Sent: 6/18/2024 6:55 PM EDT  Subject: COVID Positive    My wife and I just texted positive for COVID, but are symptom free. What should we do and not do?

## 2024-07-08 ENCOUNTER — PATIENT MESSAGE (OUTPATIENT)
Dept: FAMILY MEDICINE CLINIC | Facility: CLINIC | Age: 54
End: 2024-07-08
Payer: COMMERCIAL

## 2024-07-18 ENCOUNTER — TELEPHONE (OUTPATIENT)
Dept: FAMILY MEDICINE CLINIC | Facility: CLINIC | Age: 54
End: 2024-07-18

## 2024-07-18 NOTE — TELEPHONE ENCOUNTER
Caller: Booker De Oliveira    Relationship: Self    Best call back number: [phone]    What is the best time to reach you: ANYTIME    Who are you requesting to speak with (clinical staff, provider,  specific staff member): PROVIDER    What was the call regarding: PATIENT STATES THAT HE SENT A MESSAGE THRU [employer] A FEW WEEKS AGO AND NEVER GOT A RESPONSE. PATIENT IS REQUESTING PROVIDER TO LOOK AT THE MESSAGE AND TO RESPOND OR CALL. PLEASE ADVISE      Is it okay if the provider responds through [employer]: YES

## 2024-07-22 RX ORDER — SILDENAFIL 100 MG/1
100 TABLET, FILM COATED ORAL DAILY PRN
Qty: 10 TABLET | Refills: 1 | Status: SHIPPED | OUTPATIENT
Start: 2024-07-22

## 2024-07-22 NOTE — TELEPHONE ENCOUNTER
From: Booker De Oliveira  To: Juan Miguel Dominguez  Sent: 7/8/2024 7:10 AM EDT  Subject: Natural appetite suppressant/other question     Do you know of any natural appetite suppressants you would recommend? The Ozempic really isn’t helping any more.    Also, I’m starting to deal with some ED issues. Any natural remedies or medicines you can suggest? I would be open to idea of prescribed meds if you think that is a possibility.

## 2024-08-10 DIAGNOSIS — E11.9 TYPE 2 DIABETES MELLITUS WITHOUT COMPLICATION, WITHOUT LONG-TERM CURRENT USE OF INSULIN: ICD-10-CM

## 2024-08-13 RX ORDER — SEMAGLUTIDE 0.68 MG/ML
0.5 INJECTION, SOLUTION SUBCUTANEOUS WEEKLY
Qty: 3 ML | Refills: 1 | Status: SHIPPED | OUTPATIENT
Start: 2024-08-13

## 2024-09-05 ENCOUNTER — TELEPHONE (OUTPATIENT)
Dept: FAMILY MEDICINE CLINIC | Facility: CLINIC | Age: 54
End: 2024-09-05

## 2024-09-05 NOTE — TELEPHONE ENCOUNTER
"Relay     \"LEFT MSG FOR PATIENT. CALLED TO SEE IF HE HAD MRI BRAIN COMPLETED OR NOT?\"                "

## 2024-09-13 ENCOUNTER — PATIENT MESSAGE (OUTPATIENT)
Dept: FAMILY MEDICINE CLINIC | Facility: CLINIC | Age: 54
End: 2024-09-13
Payer: COMMERCIAL

## 2024-09-16 ENCOUNTER — PATIENT MESSAGE (OUTPATIENT)
Dept: FAMILY MEDICINE CLINIC | Facility: CLINIC | Age: 54
End: 2024-09-16
Payer: COMMERCIAL

## 2024-09-17 ENCOUNTER — PATIENT MESSAGE (OUTPATIENT)
Dept: FAMILY MEDICINE CLINIC | Facility: CLINIC | Age: 54
End: 2024-09-17
Payer: COMMERCIAL

## 2024-10-05 DIAGNOSIS — E11.9 TYPE 2 DIABETES MELLITUS WITHOUT COMPLICATION, WITHOUT LONG-TERM CURRENT USE OF INSULIN: ICD-10-CM

## 2024-10-07 RX ORDER — SEMAGLUTIDE 0.68 MG/ML
0.5 INJECTION, SOLUTION SUBCUTANEOUS WEEKLY
Qty: 3 ML | Refills: 1 | Status: SHIPPED | OUTPATIENT
Start: 2024-10-07

## 2024-10-28 DIAGNOSIS — M48.061 SPINAL STENOSIS, LUMBAR REGION, WITHOUT NEUROGENIC CLAUDICATION: ICD-10-CM

## 2024-10-28 RX ORDER — DICLOFENAC SODIUM 75 MG/1
75 TABLET, DELAYED RELEASE ORAL 2 TIMES DAILY
Qty: 60 TABLET | Refills: 5 | OUTPATIENT
Start: 2024-10-28

## 2024-10-29 ENCOUNTER — OFFICE VISIT (OUTPATIENT)
Dept: FAMILY MEDICINE CLINIC | Facility: CLINIC | Age: 54
End: 2024-10-29
Payer: COMMERCIAL

## 2024-10-29 VITALS
OXYGEN SATURATION: 96 % | DIASTOLIC BLOOD PRESSURE: 86 MMHG | WEIGHT: 299 LBS | BODY MASS INDEX: 39.63 KG/M2 | HEIGHT: 73 IN | HEART RATE: 54 BPM | SYSTOLIC BLOOD PRESSURE: 136 MMHG

## 2024-10-29 DIAGNOSIS — G89.29 CHRONIC PAIN OF LEFT KNEE: Primary | ICD-10-CM

## 2024-10-29 DIAGNOSIS — M25.562 CHRONIC PAIN OF LEFT KNEE: Primary | ICD-10-CM

## 2024-10-29 PROCEDURE — 99213 OFFICE O/P EST LOW 20 MIN: CPT | Performed by: PHYSICIAN ASSISTANT

## 2024-10-29 RX ORDER — GABAPENTIN 100 MG/1
100 CAPSULE ORAL 3 TIMES DAILY
Qty: 90 CAPSULE | Refills: 0 | Status: CANCELLED | OUTPATIENT
Start: 2024-10-29

## 2024-10-29 RX ORDER — CELECOXIB 100 MG/1
100 CAPSULE ORAL 2 TIMES DAILY PRN
Qty: 30 CAPSULE | Refills: 0 | Status: SHIPPED | OUTPATIENT
Start: 2024-10-29

## 2024-10-29 NOTE — PROGRESS NOTES
".Chief Complaint  Leg Pain (Severe Lt leg pain when leg is stretched out and pain in bottom of lt foot. )    Subjective          History of Present Illness  Booker De Oliveira is here today with his  History of Present Illness  The patient presents for evaluation of leg pain.    He has been experiencing leg pain for approximately 3 to 6 weeks, which began after receiving a steroid injection in his left knee. The pain is severe enough to wake him up at night and extends down the lateral side of leg to the arch of his foot. He suspects this might be due to diabetic neuropathy, although he has not been formally diagnosed with this condition. The pain is not muscular in nature but rather feels like a continuous, hard, nerve-related discomfort.    He has been managing the pain with ibuprofen, taking three pills at a time, which provides some relief. He reports no numbness, tingling, or swelling associated with the pain. The frequency of the pain has decreased, but the intensity remains the same, waking him up twice in the past week. He is going on vacation Friday and could not get into his ortho md.       Objective   Vital Signs:   /86   Pulse 54   Ht 185.4 cm (73\")   Wt 136 kg (299 lb)   SpO2 96%   BMI 39.45 kg/m²     Body mass index is 39.45 kg/m².         Review of Systems      Current Outpatient Medications:   •  diclofenac (VOLTAREN) 75 MG EC tablet, Take 1 tablet by mouth 2 (Two) Times a Day., Disp: 60 tablet, Rfl: 5  •  Ozempic, 0.25 or 0.5 MG/DOSE, 2 MG/3ML solution pen-injector, DIAL AND INJECT UNDER THE SKIN 0.5 MG WEEKLY, Disp: 3 mL, Rfl: 1  •  phentermine (ADIPEX-P) 37.5 MG tablet, Take 1 tablet by mouth Every Morning Before Breakfast., Disp: 30 tablet, Rfl: 0  •  sildenafil (Viagra) 100 MG tablet, Take 1 tablet by mouth Daily As Needed for Erectile Dysfunction., Disp: 10 tablet, Rfl: 1  •  valsartan-hydrochlorothiazide (DIOVAN-HCT) 160-12.5 MG per tablet, Take 1 tablet by mouth Daily., Disp: 90 tablet, " Rfl: 1  •  celecoxib (CeleBREX) 100 MG capsule, Take 1 capsule by mouth 2 (Two) Times a Day As Needed for Mild Pain., Disp: 30 capsule, Rfl: 0  •  fluticasone (FLONASE) 50 MCG/ACT nasal spray, Daily., Disp: , Rfl:   •  levocetirizine (XYZAL) 5 MG tablet, Daily., Disp: , Rfl:   •  triamcinolone (KENALOG) 0.5 % cream, 1 application  Every 12 (Twelve) Hours., Disp: , Rfl:     Allergies: Patient has no known allergies.    Physical Exam  Vitals and nursing note reviewed.   Constitutional:       General: He is not in acute distress.     Appearance: Normal appearance. He is not ill-appearing, toxic-appearing or diaphoretic.   HENT:      Head: Normocephalic and atraumatic.   Pulmonary:      Effort: Pulmonary effort is normal.   Musculoskeletal:        Legs:    Neurological:      General: No focal deficit present.      Mental Status: He is alert.   Psychiatric:         Mood and Affect: Mood normal.         Behavior: Behavior normal.      Physical Exam      Result Review :          Results               Assessment and Plan    Diagnoses and all orders for this visit:    1. Chronic pain of left knee (Primary)    -     celecoxib (CeleBREX) 100 MG capsule; Take 1 capsule by mouth 2 (Two) Times a Day As Needed for Mild Pain.  Dispense: 30 capsule; Refill: 0      Assessment & Plan    The patient's leg pain is likely due to inflammation, potentially exacerbated by a recent steroid knee injection. He reports the pain starts from the knee and extends down to the arch of his foot, which might be related to neuropathy from diabetes. The pain is described as a continuous, hard pain that occasionally wakes him up at night. He has been taking ibuprofen, which provides minimal relief.  He is advised to take Celebrex once daily to help with inflammation.    Discussed case with Dr Dominguez     Follow Up   No follow-ups on file.  Patient was given instructions and counseling regarding his condition or for health maintenance advice. Please see  specific information pulled into the AVS if appropriate.     Patient or patient representative verbalized consent for the use of Ambient Listening during the visit with  TERE Ness for chart documentation. 10/29/2024  08:35 EDT    TERE Ness  10/29/2024

## 2024-11-25 DIAGNOSIS — I10 PRIMARY HYPERTENSION: ICD-10-CM

## 2024-11-26 RX ORDER — VALSARTAN AND HYDROCHLOROTHIAZIDE 160; 12.5 MG/1; MG/1
1 TABLET, FILM COATED ORAL DAILY
Qty: 90 TABLET | Refills: 0 | Status: SHIPPED | OUTPATIENT
Start: 2024-11-26

## 2024-11-26 NOTE — TELEPHONE ENCOUNTER
Patient has been contacted and scheduled for January 30th at 8 am with Dr. Dominguez. Thank you!  
Patient was due for a 6 month follow up in Oct, needs appt set up, loosk like he's actually due for a physical exam   
[FreeTextEntry1] : Patient is a 47 year-old  gentleman with cardiovascular risk factors of dyslipidemia and prediabetes who presents for evaluation after he experienced chest pain that radiated down his left arm. He was at rest when it happened, it lasted for several minutes, and then resolved. His girlfriend thought he looked out of breath while it was happening.\par Patient says he has had episodes of chest pain/pressure in the past, but it never radiated down the arm, so this time he was more concerned.\par The chest pressure has never been exertional.\par He is active in his day-to-day life, but he does not formally exercise. \par \par PMD: Bony Dougherty MD [phone]\par Dermatologist: Luis Crow MD [phone]

## 2024-12-04 ENCOUNTER — TELEPHONE (OUTPATIENT)
Dept: FAMILY MEDICINE CLINIC | Facility: CLINIC | Age: 54
End: 2024-12-04
Payer: COMMERCIAL

## 2024-12-04 ENCOUNTER — PATIENT MESSAGE (OUTPATIENT)
Dept: FAMILY MEDICINE CLINIC | Facility: CLINIC | Age: 54
End: 2024-12-04
Payer: COMMERCIAL

## 2024-12-04 NOTE — TELEPHONE ENCOUNTER
Called [facility] in [place] and they said patient is on a 10 year colon with biopsy. We have the colonoscopy on file, but not the biopsy that I can find. They are going to send us a copy of that report

## 2024-12-08 DIAGNOSIS — E11.9 TYPE 2 DIABETES MELLITUS WITHOUT COMPLICATION, WITHOUT LONG-TERM CURRENT USE OF INSULIN: ICD-10-CM

## 2024-12-11 RX ORDER — SEMAGLUTIDE 0.68 MG/ML
0.5 INJECTION, SOLUTION SUBCUTANEOUS WEEKLY
Qty: 3 ML | Refills: 1 | Status: SHIPPED | OUTPATIENT
Start: 2024-12-11

## 2025-01-30 ENCOUNTER — OFFICE VISIT (OUTPATIENT)
Dept: FAMILY MEDICINE CLINIC | Facility: CLINIC | Age: 55
End: 2025-01-30
Payer: COMMERCIAL

## 2025-01-30 VITALS
WEIGHT: 307 LBS | HEART RATE: 72 BPM | OXYGEN SATURATION: 98 % | BODY MASS INDEX: 40.69 KG/M2 | HEIGHT: 73 IN | SYSTOLIC BLOOD PRESSURE: 150 MMHG | DIASTOLIC BLOOD PRESSURE: 68 MMHG

## 2025-01-30 DIAGNOSIS — E11.9 TYPE 2 DIABETES MELLITUS WITHOUT COMPLICATION, WITHOUT LONG-TERM CURRENT USE OF INSULIN: ICD-10-CM

## 2025-01-30 DIAGNOSIS — I10 PRIMARY HYPERTENSION: ICD-10-CM

## 2025-01-30 DIAGNOSIS — Z00.00 ROUTINE GENERAL MEDICAL EXAMINATION AT A HEALTH CARE FACILITY: Primary | ICD-10-CM

## 2025-01-30 DIAGNOSIS — Z12.5 PROSTATE CANCER SCREENING: ICD-10-CM

## 2025-01-30 LAB
EXPIRATION DATE: ABNORMAL
HBA1C MFR BLD: 6 % (ref 4.5–5.7)
Lab: ABNORMAL

## 2025-01-30 PROCEDURE — 83036 HEMOGLOBIN GLYCOSYLATED A1C: CPT | Performed by: FAMILY MEDICINE

## 2025-01-30 PROCEDURE — 99396 PREV VISIT EST AGE 40-64: CPT | Performed by: FAMILY MEDICINE

## 2025-01-30 RX ORDER — SEMAGLUTIDE 1.34 MG/ML
1 INJECTION, SOLUTION SUBCUTANEOUS WEEKLY
Qty: 3 ML | Refills: 1 | Status: SHIPPED | OUTPATIENT
Start: 2025-01-30

## 2025-01-30 RX ORDER — SILDENAFIL 100 MG/1
100 TABLET, FILM COATED ORAL DAILY PRN
Qty: 10 TABLET | Refills: 1 | Status: SHIPPED | OUTPATIENT
Start: 2025-01-30

## 2025-01-30 RX ORDER — VALSARTAN AND HYDROCHLOROTHIAZIDE 160; 12.5 MG/1; MG/1
1 TABLET, FILM COATED ORAL DAILY
Qty: 90 TABLET | Refills: 3 | Status: SHIPPED | OUTPATIENT
Start: 2025-01-30

## 2025-01-30 NOTE — PROGRESS NOTES
Male Physical Note      Date:  2025   Patient Name: Booker De Oliveira  : 1970   MRN: [id number]     Chief Complaint:    Chief Complaint   Patient presents with    Annual Exam       History of Present Illness: Booker De Oliveira is a 54 y.o. male who is here today for their annual health maintenance and physical.    History of Present Illness  The patient presents for evaluation of skin spots, weight gain, blood pressure management, diabetes mellitus, gastrointestinal issues, elbow injury, and health maintenance.    He has observed the development of minor skin spots, with one appearing approximately a month ago that extended from the base of his knuckle to the fourth knuckle. These spots are transient, typically resolving within a week to 10 days. He has also noticed similar spots on his back. He expresses concern about potential blood clotting issues, given his family history of stage 3 fatty liver disease in his brother.    He has experienced a weight gain of approximately 30 pounds. He is currently on Ozempic 0.5 mg for weight management and blood sugar control, having previously been on Mounjaro, which was discontinued due to side effects such as constipation. He is considering a switch from Ozempic to Mounjaro.    He continues to take antihypertensive medication, with home blood pressure readings typically around 130/80. He recalls an incident in 2024 when he forgot to bring his medication during a cruise, resulting in elevated blood pressure for a few days upon his return, but it subsequently normalized.    He has been experiencing unusual stomach sounds after eating for several months, which do not occur when his stomach is empty. Over the past 6 months, he has noticed that if he skips breakfast and eats midday, he needs to use the bathroom within half an hour.    He sustained a chipped bone in his elbow after hitting it against a corner while moving an object. The pain was initially absent  but developed the following night. He reports no bursitis-like symptoms. The pain, which he rates as 5 out of 10, is particularly noticeable when straightening his arm. However, he notes that the pain has been improving and does not cause him significant concern.    He has not had his A1c checked in years.    SOCIAL HISTORY  He is currently working as a  2 days a week.    FAMILY HISTORY  His brother was diagnosed with stage III fatty liver disease, bridging to stage IV. His father and uncle had blood clotting issues.    MEDICATIONS  Current: Valsartan, sildenafil, Ozempic.  Discontinued: Diclofenac, Celebrex, Mounjaro.    IMMUNIZATIONS  He has received his influenza vaccine this year, completed his shingles vaccines, and is up to date on COVID-19 and tetanus vaccines.    Subjective      Review of Systems:   Review of Systems   Constitutional:  Negative for fatigue and fever.   HENT:  Negative for congestion and ear pain.    Respiratory:  Negative for apnea, cough, chest tightness and shortness of breath.    Cardiovascular:  Negative for chest pain.   Gastrointestinal:  Negative for abdominal pain, constipation, diarrhea and nausea.   Musculoskeletal:  Negative for arthralgias.   Psychiatric/Behavioral:  Negative for depressed mood and stress.        Past Medical History:   Past Medical History:   Diagnosis Date    Benign essential HTN     Hepatitis B     Hyperlipidemia     Hypertension     Low back pain     Sleep apnea     Type 2 diabetes mellitus        Past Surgical History:   Past Surgical History:   Procedure Laterality Date    APPENDECTOMY      CHOLECYSTECTOMY  2013    [facility] [place], [place]    HERNIA REPAIR      umbilical x 2    SHOULDER ARTHROSCOPY Left     - [facility] [place]       Family History:   Family History   Problem Relation Age of Onset    Heart disease Mother     Coronary artery disease Mother     Hyperlipidemia Mother         Majority of family    Cancer Father          Pancreatic cancer    Hypertension Father     Liver cancer Father     Diabetes Father     Arthritis Maternal Grandfather     Arthritis Maternal Grandmother     Diabetes Paternal Aunt        Social History:   Social History     Socioeconomic History    Marital status:    Tobacco Use    Smoking status: Never    Smokeless tobacco: Never   Vaping Use    Vaping status: Never Used   Substance and Sexual Activity    Alcohol use: Never    Drug use: Never    Sexual activity: Yes     Partners: Female       Medications:     Current Outpatient Medications:     sildenafil (Viagra) 100 MG tablet, Take 1 tablet by mouth Daily As Needed for Erectile Dysfunction., Disp: 10 tablet, Rfl: 1    valsartan-hydrochlorothiazide (DIOVAN-HCT) 160-12.5 MG per tablet, Take 1 tablet by mouth Daily., Disp: 90 tablet, Rfl: 3    celecoxib (CeleBREX) 100 MG capsule, Take 1 capsule by mouth 2 (Two) Times a Day As Needed for Mild Pain., Disp: 30 capsule, Rfl: 0    fluticasone (FLONASE) 50 MCG/ACT nasal spray, Daily., Disp: , Rfl:     levocetirizine (XYZAL) 5 MG tablet, Daily., Disp: , Rfl:     phentermine (ADIPEX-P) 37.5 MG tablet, Take 1 tablet by mouth Every Morning Before Breakfast., Disp: 30 tablet, Rfl: 0    Semaglutide, 1 MG/DOSE, (Ozempic, 1 MG/DOSE,) 4 MG/3ML solution pen-injector, Inject 1 mg under the skin into the appropriate area as directed 1 (One) Time Per Week., Disp: 3 mL, Rfl: 1    triamcinolone (KENALOG) 0.5 % cream, 1 application  Every 12 (Twelve) Hours., Disp: , Rfl:     Allergies:   No Known Allergies    Immunization History   Administered Date(s) Administered    COVID-19 (MODERNA) 12YRS+ (SPIKEVAX) 09/15/2024    COVID-19 (PFIZER) 12YRS+ (COMIRNATY) 12/10/2023    COVID-19 (PFIZER) BIVALENT 12+YRS 09/17/2022    COVID-19 (PFIZER) Purple Cap Monovalent 01/21/2021, 02/19/2021, 10/10/2021    Fluzone (or Fluarix & Flulaval for VFC) >6mos 03/10/2022, 09/17/2022, 11/10/2023    Hepatitis A 11/08/2018, 06/12/2019    Influenza,  "Unspecified 10/06/2020, 03/10/2022, 09/17/2022    Pneumococcal, Unspecified 11/02/2010    Tdap 06/25/2015, 12/20/2018, 11/10/2023     Colorectal Screening:     Last Completed Colonoscopy            COLORECTAL CANCER SCREENING (COLONOSCOPY - Preferred) Next due on 8/3/2026      08/03/2016  Colonoscopy    08/03/2016  Colonoscopy, Scan                     Diet/Physical activity: Appropriate diet and exercise discussed    PHQ-2 Depression Screening  Little interest or pleasure in doing things? Not at all   Feeling down, depressed, or hopeless? Not at all   PHQ-2 Total Score 0          Objective     Physical Exam:  Vital Signs:   Vitals:    01/30/25 0808   BP: 150/68   Pulse: 72   SpO2: 98%   Weight: (!) 139 kg (307 lb)   Height: 185.4 cm (73\")     Body mass index is 40.5 kg/m².     Physical Exam  Vitals and nursing note reviewed.   Constitutional:       General: He is not in acute distress.     Appearance: Normal appearance. He is not ill-appearing.   HENT:      Head: Normocephalic and atraumatic.      Right Ear: Tympanic membrane and ear canal normal.      Left Ear: Tympanic membrane and ear canal normal.      Nose: Nose normal.   Cardiovascular:      Rate and Rhythm: Normal rate and regular rhythm.      Heart sounds: Normal heart sounds.   Pulmonary:      Effort: Pulmonary effort is normal.      Breath sounds: Normal breath sounds.   Neurological:      Mental Status: He is alert and oriented to person, place, and time. Mental status is at baseline.   Psychiatric:         Mood and Affect: Mood normal.       Physical Exam  There is some freckling, little hemangioma, red spot, and little skin tags on the back. No bad spots of cancer or precancer, hematomas, or bruises.    Procedures  Results  Laboratory Studies  A1c is 6.0. It was 5.5 last time.  Assessment / Plan      Assessment/Plan:   Diagnoses and all orders for this visit:    1. Routine general medical examination at a health care facility (Primary)  -     CBC & " Differential  -     Comprehensive Metabolic Panel  -     Lipid Panel  -     PSA Screen  -     TSH    2. Type 2 diabetes mellitus without complication, without long-term current use of insulin  -     POC Glycosylated Hemoglobin (Hb A1C)    3. Primary hypertension  -     valsartan-hydrochlorothiazide (DIOVAN-HCT) 160-12.5 MG per tablet; Take 1 tablet by mouth Daily.  Dispense: 90 tablet; Refill: 3  -     CBC & Differential  -     Comprehensive Metabolic Panel  -     Lipid Panel  -     TSH    4. Prostate cancer screening  -     PSA Screen    Other orders  -     Semaglutide, 1 MG/DOSE, (Ozempic, 1 MG/DOSE,) 4 MG/3ML solution pen-injector; Inject 1 mg under the skin into the appropriate area as directed 1 (One) Time Per Week.  Dispense: 3 mL; Refill: 1  -     sildenafil (Viagra) 100 MG tablet; Take 1 tablet by mouth Daily As Needed for Erectile Dysfunction.  Dispense: 10 tablet; Refill: 1       Assessment & Plan  1. Skin spots.  The skin spots are likely attributable to sun exposure and age-related skin thinning. He has never had elevated liver enzymes. Blood work will be ordered to monitor liver function, protein levels, and other relevant parameters.    2. Weight gain.  He has gained 30 pounds, which may be contributing to elevated blood pressure and gastrointestinal symptoms. The dosage of Ozempic will be increased to 1 mg this month, with a further increase to 2 mg next month. He is advised to continue monitoring his blood sugar levels.    3. Blood pressure management.  His blood pressure is slightly elevated. He is advised to monitor his blood pressure twice weekly. Refills for valsartan will be provided.    4. Diabetes mellitus.  His A1c has increased to 6.0 from 5.5. The dosage of Ozempic will be increased to 1 mg this month and 2 mg next month. He is advised to continue monitoring his blood sugar levels.    5. Gastrointestinal issues.  His gastrointestinal symptoms may be indicative of irritable bowel syndrome,  potentially exacerbated by weight gain and dietary habits. Increasing the Ozempic dosage may help alleviate these symptoms by slowing gastric motility.    6. Elbow injury.  He reports a chipped bone in his elbow from a recent injury. There is no significant swelling or bursitis noted. He is advised to monitor the pain and seek further evaluation if it worsens.    7. Health maintenance.  He is up to date on influenza, shingles, COVID-19, and tetanus vaccines. A pneumonia vaccine will be administered at age 64. An eye examination is recommended. A colonoscopy is scheduled for next year. A PSA test will be conducted during this visit. Blood work will be ordered to monitor kidney and liver function, electrolytes, thyroid, and cholesterol levels. Refills for sildenafil will be provided.    Follow-up  The patient will follow up in 2 months.          Follow Up:   No follow-ups on file.    Healthcare Maintenance:   Counseling provided on appropriate health maintenance  Booker De Oliveira voices understanding and acceptance of this advice and will call back with any further questions or concerns. AVS with preventive healthcare tips printed for patient.     Juan Miguel Dominguez MD  [facility]      Patient or patient representative verbalized consent for the use of Ambient Listening during the visit with  Juan Miguel Dominguez MD for chart documentation. 1/30/2025  09:18 EST

## 2025-01-31 LAB
ALBUMIN SERPL-MCNC: 4.3 G/DL (ref 3.8–4.9)
ALP SERPL-CCNC: 92 IU/L (ref 44–121)
ALT SERPL-CCNC: 25 IU/L (ref 0–44)
AST SERPL-CCNC: 23 IU/L (ref 0–40)
BASOPHILS # BLD AUTO: 0 X10E3/UL (ref 0–0.2)
BASOPHILS NFR BLD AUTO: 0 %
BILIRUB SERPL-MCNC: 0.6 MG/DL (ref 0–1.2)
BUN SERPL-MCNC: 15 MG/DL (ref 6–24)
BUN/CREAT SERPL: 14 (ref 9–20)
CALCIUM SERPL-MCNC: 9.8 MG/DL (ref 8.7–10.2)
CHLORIDE SERPL-SCNC: 104 MMOL/L (ref 96–106)
CHOLEST SERPL-MCNC: 133 MG/DL (ref 100–199)
CO2 SERPL-SCNC: 27 MMOL/L (ref 20–29)
CREAT SERPL-MCNC: 1.04 MG/DL (ref 0.76–1.27)
EGFRCR SERPLBLD CKD-EPI 2021: 85 ML/MIN/1.73
EOSINOPHIL # BLD AUTO: 0.5 X10E3/UL (ref 0–0.4)
EOSINOPHIL NFR BLD AUTO: 5 %
ERYTHROCYTE [DISTWIDTH] IN BLOOD BY AUTOMATED COUNT: 12.4 % (ref 11.6–15.4)
GLOBULIN SER CALC-MCNC: 2.8 G/DL (ref 1.5–4.5)
GLUCOSE SERPL-MCNC: 95 MG/DL (ref 70–99)
HCT VFR BLD AUTO: 51.5 % (ref 37.5–51)
HDLC SERPL-MCNC: 52 MG/DL
HGB BLD-MCNC: 17.1 G/DL (ref 13–17.7)
IMM GRANULOCYTES # BLD AUTO: 0 X10E3/UL (ref 0–0.1)
IMM GRANULOCYTES NFR BLD AUTO: 0 %
LDLC SERPL CALC-MCNC: 71 MG/DL (ref 0–99)
LYMPHOCYTES # BLD AUTO: 2.1 X10E3/UL (ref 0.7–3.1)
LYMPHOCYTES NFR BLD AUTO: 21 %
MCH RBC QN AUTO: 30.8 PG (ref 26.6–33)
MCHC RBC AUTO-ENTMCNC: 33.2 G/DL (ref 31.5–35.7)
MCV RBC AUTO: 93 FL (ref 79–97)
MONOCYTES # BLD AUTO: 0.7 X10E3/UL (ref 0.1–0.9)
MONOCYTES NFR BLD AUTO: 7 %
NEUTROPHILS # BLD AUTO: 6.5 X10E3/UL (ref 1.4–7)
NEUTROPHILS NFR BLD AUTO: 67 %
PLATELET # BLD AUTO: 264 X10E3/UL (ref 150–450)
POTASSIUM SERPL-SCNC: 5.7 MMOL/L (ref 3.5–5.2)
PROT SERPL-MCNC: 7.1 G/DL (ref 6–8.5)
PSA SERPL-MCNC: 0.5 NG/ML (ref 0–4)
RBC # BLD AUTO: 5.55 X10E6/UL (ref 4.14–5.8)
SODIUM SERPL-SCNC: 143 MMOL/L (ref 134–144)
TRIGL SERPL-MCNC: 40 MG/DL (ref 0–149)
TSH SERPL DL<=0.005 MIU/L-ACNC: 1.37 UIU/ML (ref 0.45–4.5)
VLDLC SERPL CALC-MCNC: 10 MG/DL (ref 5–40)
WBC # BLD AUTO: 9.9 X10E3/UL (ref 3.4–10.8)

## 2025-02-01 ENCOUNTER — PATIENT MESSAGE (OUTPATIENT)
Dept: FAMILY MEDICINE CLINIC | Facility: CLINIC | Age: 55
End: 2025-02-01
Payer: COMMERCIAL

## 2025-02-01 DIAGNOSIS — E66.01 MORBID (SEVERE) OBESITY DUE TO EXCESS CALORIES: ICD-10-CM

## 2025-02-04 ENCOUNTER — TELEPHONE (OUTPATIENT)
Dept: FAMILY MEDICINE CLINIC | Facility: CLINIC | Age: 55
End: 2025-02-04
Payer: COMMERCIAL

## 2025-02-04 RX ORDER — PHENTERMINE HYDROCHLORIDE 37.5 MG/1
37.5 TABLET ORAL
Qty: 30 TABLET | Refills: 0 | Status: SHIPPED | OUTPATIENT
Start: 2025-02-04

## 2025-02-04 RX ORDER — PHENTERMINE HYDROCHLORIDE 37.5 MG/1
37.5 TABLET ORAL
Qty: 30 TABLET | Refills: 0 | Status: SHIPPED | OUTPATIENT
Start: 2025-02-04 | End: 2025-02-04

## 2025-02-04 NOTE — TELEPHONE ENCOUNTER
It looks like we got a PA request for mounjaro but it looks like he is on phenteremin and ozempic?? Hub to relay  He can't do Mounjaro with that.

## 2025-02-04 NOTE — TELEPHONE ENCOUNTER
Name: Booker De Oliveira    Relationship: Self    Best Callback Number: [phone]    HUB PROVIDED THE RELAY MESSAGE FROM THE OFFICE   PATIENT VOICED UNDERSTANDING AND HAS NO FURTHER QUESTIONS AT THIS TIME    ADDITIONAL INFORMATION: PT STATED THAT HE NEVER REQUESTED MOUNJARO

## 2025-02-17 ENCOUNTER — PATIENT MESSAGE (OUTPATIENT)
Dept: FAMILY MEDICINE CLINIC | Facility: CLINIC | Age: 55
End: 2025-02-17
Payer: COMMERCIAL

## 2025-02-19 RX ORDER — FAMOTIDINE 40 MG/1
40 TABLET, FILM COATED ORAL DAILY
Qty: 30 TABLET | Refills: 5 | Status: SHIPPED | OUTPATIENT
Start: 2025-02-19

## 2025-02-20 ENCOUNTER — OFFICE VISIT (OUTPATIENT)
Dept: FAMILY MEDICINE CLINIC | Facility: CLINIC | Age: 55
End: 2025-02-20
Payer: COMMERCIAL

## 2025-02-20 VITALS
SYSTOLIC BLOOD PRESSURE: 134 MMHG | HEIGHT: 73 IN | OXYGEN SATURATION: 98 % | DIASTOLIC BLOOD PRESSURE: 86 MMHG | HEART RATE: 75 BPM | WEIGHT: 297 LBS | BODY MASS INDEX: 39.36 KG/M2

## 2025-02-20 DIAGNOSIS — S61.412A SUPERFICIAL LACERATION OF HAND, LEFT, INITIAL ENCOUNTER: Primary | ICD-10-CM

## 2025-02-20 PROCEDURE — 99213 OFFICE O/P EST LOW 20 MIN: CPT | Performed by: PHYSICIAN ASSISTANT

## 2025-02-20 NOTE — PROGRESS NOTES
".Chief Complaint  Laceration (Cut on hand that is not healing.)    Subjective          History of Present Illness  Booker De Oliveira is here today with his  History of Present Illness  The patient presents for evaluation of a hand injury.    He sustained an injury to his hand while lifting a laundry basket last Saturday morning. He scraped the dorsal surface of his left hand. The wound appears superficial, with no significant depth observed. He reports that the wound was actively bleeding this morning, but the bleeding has since ceased. He expresses concern about potential clotting issues, as he has noticed the presence of other small spots on his hands. He recently underwent a comprehensive physical examination, which included blood work. He has a history of surgical procedures, with no complications.    SOCIAL HISTORY  The patient is a retired .    MEDICATIONS  Celebrex    Objective   Vital Signs:   /86   Pulse 75   Ht 185.4 cm (73\")   Wt 135 kg (297 lb)   SpO2 98%   BMI 39.18 kg/m²     Body mass index is 39.18 kg/m².         Review of Systems      Current Outpatient Medications:     famotidine (PEPCID) 40 MG tablet, Take 1 tablet by mouth Daily., Disp: 30 tablet, Rfl: 5    phentermine (ADIPEX-P) 37.5 MG tablet, Take 1 tablet by mouth Every Morning Before Breakfast., Disp: 30 tablet, Rfl: 0    Semaglutide, 1 MG/DOSE, (Ozempic, 1 MG/DOSE,) 4 MG/3ML solution pen-injector, Inject 1 mg under the skin into the appropriate area as directed 1 (One) Time Per Week., Disp: 3 mL, Rfl: 1    sildenafil (Viagra) 100 MG tablet, Take 1 tablet by mouth Daily As Needed for Erectile Dysfunction., Disp: 10 tablet, Rfl: 1    valsartan-hydrochlorothiazide (DIOVAN-HCT) 160-12.5 MG per tablet, Take 1 tablet by mouth Daily., Disp: 90 tablet, Rfl: 3    Allergies: Patient has no known allergies.    Physical Exam  Vitals and nursing note reviewed.   Constitutional:       General: He is not in acute distress.     " Appearance: Normal appearance. He is not ill-appearing, toxic-appearing or diaphoretic.   HENT:      Head: Normocephalic and atraumatic.   Pulmonary:      Effort: Pulmonary effort is normal.   Musculoskeletal:        Hands:    Neurological:      General: No focal deficit present.      Mental Status: He is alert.   Psychiatric:         Mood and Affect: Mood normal.         Behavior: Behavior normal.        Physical Exam      Result Review :          Results               Assessment and Plan    Diagnoses and all orders for this visit:    1. Superficial laceration of hand, left, initial encounter (Primary)      Assessment & Plan    The patient's hand injury is characterized by the removal of the superficial skin layer, which is expected to prolong the healing process. The location of the injury, being in a highly mobile area, further complicates the formation of a stable scab. The presence of dark spots on the wound is likely due to the cracking of the scab, leading to minor bleeding. His blood work results from 01/30/2025 were within normal limits, and there is no indication of a clotting disorder. Would recommend continuing to cover area at night. If area becomes red or painful he is to return to clinic      Follow Up   No follow-ups on file.  Patient was given instructions and counseling regarding his condition or for health maintenance advice. Please see specific information pulled into the AVS if appropriate.     Patient or patient representative verbalized consent for the use of Ambient Listening during the visit with  TERE Ness for chart documentation. 2/20/2025  14:46 EST    TERE Ness  02/20/2025

## 2025-03-10 DIAGNOSIS — E66.01 MORBID (SEVERE) OBESITY DUE TO EXCESS CALORIES: ICD-10-CM

## 2025-03-12 DIAGNOSIS — E66.01 MORBID (SEVERE) OBESITY DUE TO EXCESS CALORIES: ICD-10-CM

## 2025-03-12 RX ORDER — PHENTERMINE HYDROCHLORIDE 37.5 MG/1
37.5 TABLET ORAL
Qty: 30 TABLET | Refills: 0 | Status: SHIPPED | OUTPATIENT
Start: 2025-03-12

## 2025-03-13 RX ORDER — PHENTERMINE HYDROCHLORIDE 37.5 MG/1
37.5 TABLET ORAL
Qty: 30 TABLET | OUTPATIENT
Start: 2025-03-13

## 2025-03-28 ENCOUNTER — OFFICE VISIT (OUTPATIENT)
Dept: FAMILY MEDICINE CLINIC | Facility: CLINIC | Age: 55
End: 2025-03-28
Payer: COMMERCIAL

## 2025-03-28 VITALS
HEIGHT: 73 IN | OXYGEN SATURATION: 98 % | SYSTOLIC BLOOD PRESSURE: 124 MMHG | WEIGHT: 281 LBS | HEART RATE: 57 BPM | BODY MASS INDEX: 37.24 KG/M2 | DIASTOLIC BLOOD PRESSURE: 64 MMHG

## 2025-03-28 DIAGNOSIS — I10 PRIMARY HYPERTENSION: Primary | ICD-10-CM

## 2025-03-28 DIAGNOSIS — K21.9 GASTROESOPHAGEAL REFLUX DISEASE WITHOUT ESOPHAGITIS: ICD-10-CM

## 2025-03-28 DIAGNOSIS — M75.80 ROTATOR CUFF TENDINITIS, UNSPECIFIED LATERALITY: ICD-10-CM

## 2025-03-28 DIAGNOSIS — E66.01 MORBID (SEVERE) OBESITY DUE TO EXCESS CALORIES: ICD-10-CM

## 2025-03-28 RX ORDER — SEMAGLUTIDE 1.34 MG/ML
1 INJECTION, SOLUTION SUBCUTANEOUS WEEKLY
Qty: 3 ML | Refills: 1 | OUTPATIENT
Start: 2025-03-28

## 2025-03-28 RX ORDER — SEMAGLUTIDE 1.34 MG/ML
1 INJECTION, SOLUTION SUBCUTANEOUS WEEKLY
Qty: 3 ML | Refills: 5 | Status: SHIPPED | OUTPATIENT
Start: 2025-03-28

## 2025-03-28 RX ORDER — PHENTERMINE HYDROCHLORIDE 37.5 MG/1
37.5 TABLET ORAL
Qty: 30 TABLET | Refills: 0 | Status: SHIPPED | OUTPATIENT
Start: 2025-03-28

## 2025-03-28 NOTE — PROGRESS NOTES
Follow Up Office Visit      Date of Visit:  2025   Patient Name: Booker De Oliveira  : 1970   MRN: [id number]     Chief Complaint:    Chief Complaint   Patient presents with    Weight Check       History of Present Illness: Booker De Oliveira is a 54 y.o. male who is here today for follow up.    History of Present Illness  The patient presents for weight management, hypertension, heartburn, and rotator cuff tendinitis.    He has been experiencing constipation, which he attributes to his medication regimen. He also reports initial anxiety, which has since subsided. He is currently on Ozempic and is seeking a refill of this medication. He is on phentermine.    He is inquiring about the necessity of continuing his heartburn medication, as he has not experienced any side effects from it. However, he notes that if he misses a dose, his symptoms return by the end of the day.    He has been receiving steroid injections in his knee following a knee replacement surgery. Additionally, he had a similar injection in his shoulder, which provided significant relief. He was referred to physical therapy but expresses a dislike for it. He has a history of rotator cuff surgery performed 9 years ago.    His blood pressure readings have remained within normal limits, with the exception of a single elevated reading of 159/90 on 2025.    MEDICATIONS  Current: Phentermine, Ozempic      Subjective      Review of Systems:   Review of Systems    Past Medical History:   Past Medical History:   Diagnosis Date    Benign essential HTN     Cervical disc disorder 2019    Unsure of date 1st noticed    Claustrophobia     Hepatitis B     Hyperlipidemia     Hypertension     Low back pain     Lumbosacral disc disease 2019 ??    Unsure of date 1st noticed    Sleep apnea     Spinal stenosis 2019    Thoracic disc disorder 2019 ??    Unsure of date    Type 2 diabetes mellitus        Past Surgical History:   Past Surgical History:   Procedure  "Laterality Date    APPENDECTOMY      CHOLECYSTECTOMY  2013    [place], [place]    HERNIA REPAIR      umbilical x 2    SHOULDER ARTHROSCOPY Left     x2- [facility] [place]    VASCULAR SURGERY  2015    Varicose vein surgery- legs       Family History:   Family History   Problem Relation Age of Onset    Heart disease Mother     Coronary artery disease Mother     Hyperlipidemia Mother         Majority of family    Cancer Father         Pancreatic cancer    Hypertension Father     Liver cancer Father     Diabetes Father     Arthritis Maternal Grandfather     Arthritis Maternal Grandmother     Diabetes Paternal Aunt        Social History:   Social History     Socioeconomic History    Marital status:    Tobacco Use    Smoking status: Never    Smokeless tobacco: Never   Vaping Use    Vaping status: Never Used   Substance and Sexual Activity    Alcohol use: Never    Drug use: Never    Sexual activity: Yes     Partners: Female       Medications:     Current Outpatient Medications:     phentermine (ADIPEX-P) 37.5 MG tablet, Take 1 tablet by mouth Every Morning Before Breakfast., Disp: 30 tablet, Rfl: 0    Semaglutide, 1 MG/DOSE, (Ozempic, 1 MG/DOSE,) 4 MG/3ML solution pen-injector, Inject 1 mg under the skin into the appropriate area as directed 1 (One) Time Per Week., Disp: 3 mL, Rfl: 5    famotidine (PEPCID) 40 MG tablet, Take 1 tablet by mouth Daily., Disp: 30 tablet, Rfl: 5    sildenafil (Viagra) 100 MG tablet, Take 1 tablet by mouth Daily As Needed for Erectile Dysfunction., Disp: 10 tablet, Rfl: 1    valsartan-hydrochlorothiazide (DIOVAN-HCT) 160-12.5 MG per tablet, Take 1 tablet by mouth Daily., Disp: 90 tablet, Rfl: 3    Allergies:   No Known Allergies    Objective     Physical Exam:  Vital Signs:   Vitals:    03/28/25 0909   BP: 124/64   Pulse: 57   SpO2: 98%   Weight: 127 kg (281 lb)   Height: 185.4 cm (73\")     Body mass index is 37.07 kg/m².     Physical Exam  Vitals and nursing note reviewed. "   Constitutional:       General: He is not in acute distress.     Appearance: Normal appearance. He is not ill-appearing.   HENT:      Head: Normocephalic and atraumatic.      Right Ear: Tympanic membrane and ear canal normal.      Left Ear: Tympanic membrane and ear canal normal.      Nose: Nose normal.   Cardiovascular:      Rate and Rhythm: Normal rate and regular rhythm.      Heart sounds: Normal heart sounds.   Pulmonary:      Effort: Pulmonary effort is normal.      Breath sounds: Normal breath sounds.   Neurological:      Mental Status: He is alert and oriented to person, place, and time. Mental status is at baseline.   Psychiatric:         Mood and Affect: Mood normal.       Physical Exam  Vital Signs  Blood pressure is 124/64.    Procedures    Results    Assessment / Plan      Assessment/Plan:   Diagnoses and all orders for this visit:    1. Primary hypertension (Primary)    2. Morbid (severe) obesity due to excess calories  -     phentermine (ADIPEX-P) 37.5 MG tablet; Take 1 tablet by mouth Every Morning Before Breakfast.  Dispense: 30 tablet; Refill: 0    3. Gastroesophageal reflux disease without esophagitis    4. Rotator cuff tendinitis, unspecified laterality    Other orders  -     Semaglutide, 1 MG/DOSE, (Ozempic, 1 MG/DOSE,) 4 MG/3ML solution pen-injector; Inject 1 mg under the skin into the appropriate area as directed 1 (One) Time Per Week.  Dispense: 3 mL; Refill: 5       Assessment & Plan  1. Weight management.  He has demonstrated a commendable decrease in weight, from 299 to 281 pounds, since the initiation of phentermine therapy. He will continue with the current regimen of phentermine for a few more months. The potential for reducing the frequency of phentermine intake to every other day will be discussed during the next visit.    2. Hypertension.  His blood pressure has remained stable, with today's reading at 124/64 mmHg. He will continue with his current antihypertensive medications.    3.  Heartburn.  He will continue his heartburn medication daily as it effectively manages his symptoms. No side effects have been reported with daily use.    4. Rotator cuff tendinitis.  He has received steroid injections in his shoulder, which have provided significant relief. Physical therapy was recommended but he expressed a dislike for it. If symptoms persist, further treatment options will be considered.    Follow-up  The patient is scheduled for a follow-up visit in 1 month.    PROCEDURE  The patient has a history of knee replacement surgery and received steroid injections in the knee and shoulder.        Follow Up:   No follow-ups on file.    Juan Miguel Dominguez  [facility] [place]     Patient or patient representative verbalized consent for the use of Ambient Listening during the visit with  Juan Miguel Dominguez MD for chart documentation. 3/28/2025  09:32 EDT

## 2025-05-05 ENCOUNTER — OFFICE VISIT (OUTPATIENT)
Dept: FAMILY MEDICINE CLINIC | Facility: CLINIC | Age: 55
End: 2025-05-05
Payer: COMMERCIAL

## 2025-05-05 VITALS
SYSTOLIC BLOOD PRESSURE: 120 MMHG | HEART RATE: 61 BPM | DIASTOLIC BLOOD PRESSURE: 78 MMHG | WEIGHT: 276 LBS | OXYGEN SATURATION: 98 % | BODY MASS INDEX: 36.58 KG/M2 | HEIGHT: 73 IN

## 2025-05-05 DIAGNOSIS — E66.01 MORBID (SEVERE) OBESITY DUE TO EXCESS CALORIES: ICD-10-CM

## 2025-05-05 DIAGNOSIS — I10 PRIMARY HYPERTENSION: ICD-10-CM

## 2025-05-05 DIAGNOSIS — G89.29 CHRONIC SHOULDER PAIN, UNSPECIFIED LATERALITY: ICD-10-CM

## 2025-05-05 DIAGNOSIS — M25.519 CHRONIC SHOULDER PAIN, UNSPECIFIED LATERALITY: ICD-10-CM

## 2025-05-05 DIAGNOSIS — E11.9 TYPE 2 DIABETES MELLITUS WITHOUT COMPLICATION, WITHOUT LONG-TERM CURRENT USE OF INSULIN: Primary | ICD-10-CM

## 2025-05-05 RX ORDER — SEMAGLUTIDE 2.68 MG/ML
2 INJECTION, SOLUTION SUBCUTANEOUS WEEKLY
Qty: 2 ML | Refills: 5 | Status: SHIPPED | OUTPATIENT
Start: 2025-05-05

## 2025-05-05 RX ORDER — PHENTERMINE HYDROCHLORIDE 37.5 MG/1
37.5 TABLET ORAL
Qty: 30 TABLET | Refills: 0 | Status: SHIPPED | OUTPATIENT
Start: 2025-05-05

## 2025-05-06 NOTE — PROGRESS NOTES
Follow Up Office Visit      Date of Visit:  2025   Patient Name: Booker De Oliveira  : 1970   MRN: [id number]     Chief Complaint:  No chief complaint on file.      History of Present Illness: Booker De Oliveira is a 54 y.o. male who is here today for follow up.    History of Present Illness  The patient presents for evaluation of diabetes, weight management, blood pressure management, acid reflux, erectile dysfunction, skin spots, and shoulder pain.    He reports a positive response to semaglutide, which he administers on Sundays. He has not experienced any adverse effects from the medication and is currently on a 1 mg dose. He has not previously tried the 2 mg dose. His A1c was 6 in 2025.    He has been experiencing constipation throughout his life.    He is on valsartan for blood pressure management and reports a current blood pressure reading of 120/78. He is on famotidine for acid reflux and sildenafil for erectile dysfunction.    He has noticed the development of spots on his right hand, which appear to worsen upon contact with objects. He expresses concern about a potential infection in one of these spots.    He anticipates the need for shoulder surgery in the upcoming  season. Approximately 9 years ago, he underwent physical therapy, which provided significant relief. However, the benefits were short-lived, as his symptoms returned within 2 days of discontinuing therapy. He received a shoulder injection from Dr. Mccann, which alleviated his symptoms for approximately 4 to 5 weeks. He expresses reluctance towards undergoing shoulder surgery.      Subjective      Review of Systems:   Review of Systems    Past Medical History:   Past Medical History:   Diagnosis Date    Benign essential HTN     Cervical disc disorder 2019    Unsure of date 1st noticed    Claustrophobia     Hepatitis B     Hyperlipidemia     Hypertension     Low back pain     Lumbosacral disc disease 2019 ??    Unsure of date 1st  noticed    Sleep apnea     Spinal stenosis 2019    Thoracic disc disorder 2019 ??    Unsure of date    Type 2 diabetes mellitus        Past Surgical History:   Past Surgical History:   Procedure Laterality Date    APPENDECTOMY      CHOLECYSTECTOMY  2013    [facility], [place]    HERNIA REPAIR      umbilical x 2    SHOULDER ARTHROSCOPY Left     x2- [facility] [place]    VASCULAR SURGERY  2015    Varicose vein surgery- legs       Family History:   Family History   Problem Relation Age of Onset    Heart disease Mother     Coronary artery disease Mother     Hyperlipidemia Mother         Majority of family    Cancer Father         Pancreatic cancer    Hypertension Father     Liver cancer Father     Diabetes Father     Arthritis Maternal Grandfather     Arthritis Maternal Grandmother     Diabetes Paternal Aunt        Social History:   Social History     Socioeconomic History    Marital status:    Tobacco Use    Smoking status: Never    Smokeless tobacco: Never   Vaping Use    Vaping status: Never Used   Substance and Sexual Activity    Alcohol use: Never    Drug use: Never    Sexual activity: Yes     Partners: Female       Medications:     Current Outpatient Medications:     phentermine (ADIPEX-P) 37.5 MG tablet, Take 1 tablet by mouth Every Morning Before Breakfast., Disp: 30 tablet, Rfl: 0    famotidine (PEPCID) 40 MG tablet, Take 1 tablet by mouth Daily., Disp: 30 tablet, Rfl: 5    Semaglutide, 2 MG/DOSE, (Ozempic, 2 MG/DOSE,) 8 MG/3ML solution pen-injector, Inject 2 mg under the skin into the appropriate area as directed 1 (One) Time Per Week., Disp: 2 mL, Rfl: 5    sildenafil (Viagra) 100 MG tablet, Take 1 tablet by mouth Daily As Needed for Erectile Dysfunction., Disp: 10 tablet, Rfl: 1    valsartan-hydrochlorothiazide (DIOVAN-HCT) 160-12.5 MG per tablet, Take 1 tablet by mouth Daily., Disp: 90 tablet, Rfl: 3    Allergies:   No Known Allergies    Objective     Physical Exam:  Vital Signs:   Vitals:  "   05/05/25 1102   BP: 120/78   Pulse: 61   SpO2: 98%   Weight: 125 kg (276 lb)   Height: 185.4 cm (73\")     Body mass index is 36.41 kg/m².     Physical Exam  Vitals and nursing note reviewed.   Constitutional:       General: He is not in acute distress.     Appearance: Normal appearance. He is not ill-appearing.   HENT:      Head: Normocephalic and atraumatic.      Right Ear: Tympanic membrane and ear canal normal.      Left Ear: Tympanic membrane and ear canal normal.      Nose: Nose normal.   Cardiovascular:      Rate and Rhythm: Normal rate and regular rhythm.      Heart sounds: Normal heart sounds.   Pulmonary:      Effort: Pulmonary effort is normal.      Breath sounds: Normal breath sounds.   Neurological:      Mental Status: He is alert and oriented to person, place, and time. Mental status is at baseline.   Psychiatric:         Mood and Affect: Mood normal.       Physical Exam      Procedures    Results  Labs   - A1c: 01/2025, 6   - Blood counts: Normal   - Platelets: Normal   - Liver function: Normal  Assessment / Plan      Assessment/Plan:   Diagnoses and all orders for this visit:    1. Type 2 diabetes mellitus without complication, without long-term current use of insulin (Primary)    2. Morbid (severe) obesity due to excess calories  -     phentermine (ADIPEX-P) 37.5 MG tablet; Take 1 tablet by mouth Every Morning Before Breakfast.  Dispense: 30 tablet; Refill: 0    3. Primary hypertension    4. Chronic shoulder pain, unspecified laterality    Other orders  -     Semaglutide, 2 MG/DOSE, (Ozempic, 2 MG/DOSE,) 8 MG/3ML solution pen-injector; Inject 2 mg under the skin into the appropriate area as directed 1 (One) Time Per Week.  Dispense: 2 mL; Refill: 5       Assessment & Plan  1. Diabetes.  - His A1c level was recorded at 6 in 01/2025.  - Blood pressure is well-controlled at 120/78 mmHg.  - The dosage of semaglutide will be increased to 2 mg.  - A prescription for Ozempic will be provided.    2. " Weight management.  - Weight has decreased from 307 lbs in 01/2025 to 276 lbs currently.  - He has been experiencing constipation throughout his life.  - The dosage of semaglutide will be increased to 2 mg to aid in weight loss.  - A prescription for Ozempic will be provided.    3. Blood pressure management.  - Blood pressure is well-controlled at 120/78 mmHg.  - He is currently on valsartan.  - Blood counts and liver function are normal.  - Continue current medication regimen.    4. Shoulder pain.  - Previous physical therapy and shoulder injections provided temporary relief.  - He may need to consult with an orthopedic specialist regarding potential shoulder surgery.  - Imaging and insurance approval are required for further shoulder injections.  - Discussed the impact of shoulder pain on daily activities and potential need for surgery.    Follow-up  The patient will follow up in 1 month.        Follow Up:   No follow-ups on file.    Juan Miguel Dominguez  [facility] [place] [facility] [place]     Patient or patient representative verbalized consent for the use of Ambient Listening during the visit with  Juan Miguel Dominguez MD for chart documentation. 5/6/2025  16:25 EDT

## 2025-06-11 ENCOUNTER — OFFICE VISIT (OUTPATIENT)
Dept: FAMILY MEDICINE CLINIC | Facility: CLINIC | Age: 55
End: 2025-06-11
Payer: COMMERCIAL

## 2025-06-11 VITALS
WEIGHT: 275 LBS | OXYGEN SATURATION: 98 % | SYSTOLIC BLOOD PRESSURE: 140 MMHG | DIASTOLIC BLOOD PRESSURE: 88 MMHG | BODY MASS INDEX: 36.45 KG/M2 | HEIGHT: 73 IN | HEART RATE: 60 BPM

## 2025-06-11 DIAGNOSIS — H61.23 IMPACTED CERUMEN OF BOTH EARS: Primary | ICD-10-CM

## 2025-06-11 DIAGNOSIS — E66.01 MORBID (SEVERE) OBESITY DUE TO EXCESS CALORIES: ICD-10-CM

## 2025-06-11 RX ORDER — PHENTERMINE HYDROCHLORIDE 37.5 MG/1
37.5 TABLET ORAL
Qty: 30 TABLET | Refills: 0 | Status: SHIPPED | OUTPATIENT
Start: 2025-06-11

## 2025-06-11 NOTE — PROGRESS NOTES
Follow Up Office Visit      Date of Visit:  2025   Patient Name: Booker De Oliveira  : 1970   MRN: [id number]     Chief Complaint:    Chief Complaint   Patient presents with    Diabetes       History of Present Illness: Booker De Oliveira is a 54 y.o. male who is here today for follow up.    History of Present Illness  The patient presents for a routine checkup.    He reports the presence of spots on his hands, which have now spread to his arms. These spots are intermittent in nature and he has been scratching them. He does not recall any specific incident that could have caused this. Additionally, he mentions the presence of dry spots on his scalp, which occasionally bleed when his hair is cut.    He experiences episodes of extreme fatigue, occurring approximately 4 days per week, which he attributes to his phentermine medication. These episodes are severe enough to render him non-functional, necessitating rest. He has a history of sleep apnea but reports no current symptoms such as snoring or restlessness at night. He has lost weight since his diagnosis of sleep apnea. He typically skips lunch and has noticed a correlation between his eating habits and the onset of these fatigue episodes.     He is currently on Ozempic and valsartan, both of which were refilled in 2025 and are due for refill in 2025. He is also on famotidine for acid reflux, which is due for refill in 2025. He has not monitored his blood pressure since 2025. He does not require a refill of his Viagra prescription at this time. His goal weight is 269 pounds. He has been making efforts to increase his physical activity, including walking and mowing the lawn. He is uncertain about the frequency of his phentermine use but recalls that it was discussed during his last visit.      Subjective      Review of Systems:   Review of Systems    Past Medical History:   Past Medical History:   Diagnosis Date    Benign essential HTN      Cervical disc disorder 2019    Unsure of date 1st noticed    Claustrophobia     Hepatitis B     Hyperlipidemia     Hypertension     Low back pain     Lumbosacral disc disease 2019 ??    Unsure of date 1st noticed    Sleep apnea     Spinal stenosis 2019    Thoracic disc disorder 2019 ??    Unsure of date    Type 2 diabetes mellitus        Past Surgical History:   Past Surgical History:   Procedure Laterality Date    APPENDECTOMY      CHOLECYSTECTOMY  2013    [place], [place]    HERNIA REPAIR      umbilical x 2    SHOULDER ARTHROSCOPY Left     [place] [facility] [place]    VASCULAR SURGERY  2015    Varicose vein surgery- legs       Family History:   Family History   Problem Relation Age of Onset    Heart disease Mother     Coronary artery disease Mother     Hyperlipidemia Mother         Majority of family    Cancer Father         Pancreatic cancer    Hypertension Father     Liver cancer Father     Diabetes Father     Arthritis Maternal Grandfather     Arthritis Maternal Grandmother     Diabetes Paternal Aunt        Social History:   Social History     Socioeconomic History    Marital status:    Tobacco Use    Smoking status: Never    Smokeless tobacco: Never   Vaping Use    Vaping status: Never Used   Substance and Sexual Activity    Alcohol use: Never    Drug use: Never    Sexual activity: Yes     Partners: Female       Medications:     Current Outpatient Medications:     famotidine (PEPCID) 40 MG tablet, Take 1 tablet by mouth Daily., Disp: 30 tablet, Rfl: 5    phentermine (ADIPEX-P) 37.5 MG tablet, Take 1 tablet by mouth Every Morning Before Breakfast., Disp: 30 tablet, Rfl: 0    Semaglutide, 2 MG/DOSE, (Ozempic, 2 MG/DOSE,) 8 MG/3ML solution pen-injector, Inject 2 mg under the skin into the appropriate area as directed 1 (One) Time Per Week., Disp: 2 mL, Rfl: 5    sildenafil (Viagra) 100 MG tablet, Take 1 tablet by mouth Daily As Needed for Erectile Dysfunction., Disp: 10 tablet, Rfl: 1     "valsartan-hydrochlorothiazide (DIOVAN-HCT) 160-12.5 MG per tablet, Take 1 tablet by mouth Daily., Disp: 90 tablet, Rfl: 3    Allergies:   No Known Allergies    Objective     Physical Exam:  Vital Signs:   Vitals:    06/11/25 0917   BP: 140/88   Pulse: 60   SpO2: 98%   Weight: 125 kg (275 lb)   Height: 185.4 cm (73\")     Body mass index is 36.28 kg/m².     Physical Exam  Vitals and nursing note reviewed.   Constitutional:       General: He is not in acute distress.     Appearance: Normal appearance. He is not ill-appearing.   HENT:      Head: Normocephalic and atraumatic.      Right Ear: Tympanic membrane and ear canal normal.      Left Ear: Tympanic membrane and ear canal normal.      Nose: Nose normal.   Cardiovascular:      Rate and Rhythm: Normal rate and regular rhythm.      Heart sounds: Normal heart sounds.   Pulmonary:      Effort: Pulmonary effort is normal.      Breath sounds: Normal breath sounds.   Neurological:      Mental Status: He is alert and oriented to person, place, and time. Mental status is at baseline.   Psychiatric:         Mood and Affect: Mood normal.       Physical Exam  Ears: Both ear canals are occluded with cerumen, requiring cleaning.  Skin: Multiple dry spots on the scalp, no actinic keratosis noted.    Procedures    Results  Labs   - Platelet count: Normal   - B12 levels: Normal   - Iron levels: Normal   - Thyroid function test: Normal  Assessment / Plan      Assessment/Plan:   Diagnoses and all orders for this visit:    1. Type 2 diabetes mellitus without complication, without long-term current use of insulin (Primary)       Assessment & Plan  1. Dermatological concerns.  His platelet count is within the normal range. The presence of crusty spots on his scalp does not indicate actinic keratosis or precancerous lesions. A referral to a dermatologist was suggested for further evaluation.    2. Diabetes/Weight loss.  His blood work from approximately 5 months ago showed no deficiencies " in B12 or iron, no anemia, and normal thyroid function. There is no evidence of lung disease or chest pain. His blood pressure readings have been consistently normal, with a slight elevation noted today. He has achieved a weight loss of approximately 25 pounds since 02/2025. He was advised to monitor his blood sugar levels during episodes of fatigue. He was also encouraged to incorporate regular physical activity into his routine, specifically walking for 30 minutes, 4 to 5 days per week at a brisk pace. He was advised to continue taking Ozempic and valsartan as prescribed. He was also advised to take phentermine every other day to maximize its effectiveness on those days and to help with exercise.    3. Cerumen impaction.  He reported issues with hearing, which may be related to earwax buildup. His ears will be cleaned during this visit.    Follow-up  The patient will follow up in 10/2025.        Follow Up:   No follow-ups on file.    Juan Miguel Dominguez  [facility] [place] [place] [facility] [place]     Patient or patient representative verbalized consent for the use of Ambient Listening during the visit with  Juan Miguel Dominguez MD for chart documentation. 6/11/2025  09:52 EDT